# Patient Record
Sex: FEMALE | Race: WHITE | NOT HISPANIC OR LATINO | URBAN - METROPOLITAN AREA
[De-identification: names, ages, dates, MRNs, and addresses within clinical notes are randomized per-mention and may not be internally consistent; named-entity substitution may affect disease eponyms.]

---

## 2024-01-12 ENCOUNTER — INITIAL PRENATAL (OUTPATIENT)
Dept: OBSTETRICS AND GYNECOLOGY | Facility: CLINIC | Age: 29
End: 2024-01-12
Payer: COMMERCIAL

## 2024-01-12 ENCOUNTER — APPOINTMENT (OUTPATIENT)
Dept: OBSTETRICS AND GYNECOLOGY | Facility: CLINIC | Age: 29
End: 2024-01-12
Payer: COMMERCIAL

## 2024-01-12 VITALS
SYSTOLIC BLOOD PRESSURE: 102 MMHG | HEIGHT: 61 IN | DIASTOLIC BLOOD PRESSURE: 62 MMHG | WEIGHT: 129 LBS | BODY MASS INDEX: 24.35 KG/M2

## 2024-01-12 DIAGNOSIS — Z3A.10 10 WEEKS GESTATION OF PREGNANCY (HHS-HCC): ICD-10-CM

## 2024-01-12 DIAGNOSIS — Z87.898 HISTORY OF SUBSTANCE USE: ICD-10-CM

## 2024-01-12 DIAGNOSIS — B00.9 HSV (HERPES SIMPLEX VIRUS) INFECTION: ICD-10-CM

## 2024-01-12 DIAGNOSIS — Z34.81 ENCOUNTER FOR SUPERVISION OF OTHER NORMAL PREGNANCY, FIRST TRIMESTER (HHS-HCC): Primary | ICD-10-CM

## 2024-01-12 LAB
AMPHETAMINES UR QL SCN: NORMAL
BARBITURATES UR QL SCN: NORMAL
BENZODIAZ UR QL SCN: NORMAL
BZE UR QL SCN: NORMAL
CANNABINOIDS UR QL SCN: NORMAL
ERYTHROCYTE [DISTWIDTH] IN BLOOD BY AUTOMATED COUNT: 13.1 % (ref 11.5–14.5)
FENTANYL+NORFENTANYL UR QL SCN: NORMAL
HCT VFR BLD AUTO: 35 % (ref 36–46)
HGB BLD-MCNC: 11.3 G/DL (ref 12–16)
MCH RBC QN AUTO: 29.8 PG (ref 26–34)
MCHC RBC AUTO-ENTMCNC: 32.3 G/DL (ref 32–36)
MCV RBC AUTO: 92 FL (ref 80–100)
NRBC BLD-RTO: 0 /100 WBCS (ref 0–0)
OPIATES UR QL SCN: NORMAL
OXYCODONE+OXYMORPHONE UR QL SCN: NORMAL
PCP UR QL SCN: NORMAL
PLATELET # BLD AUTO: 321 X10*3/UL (ref 150–450)
RBC # BLD AUTO: 3.79 X10*6/UL (ref 4–5.2)
WBC # BLD AUTO: 9.3 X10*3/UL (ref 4.4–11.3)

## 2024-01-12 PROCEDURE — 90471 IMMUNIZATION ADMIN: CPT | Performed by: OBSTETRICS & GYNECOLOGY

## 2024-01-12 PROCEDURE — 86317 IMMUNOASSAY INFECTIOUS AGENT: CPT

## 2024-01-12 PROCEDURE — 86900 BLOOD TYPING SEROLOGIC ABO: CPT

## 2024-01-12 PROCEDURE — 90686 IIV4 VACC NO PRSV 0.5 ML IM: CPT | Performed by: OBSTETRICS & GYNECOLOGY

## 2024-01-12 PROCEDURE — 86850 RBC ANTIBODY SCREEN: CPT

## 2024-01-12 PROCEDURE — 86780 TREPONEMA PALLIDUM: CPT

## 2024-01-12 PROCEDURE — 86901 BLOOD TYPING SEROLOGIC RH(D): CPT

## 2024-01-12 PROCEDURE — 87389 HIV-1 AG W/HIV-1&-2 AB AG IA: CPT

## 2024-01-12 PROCEDURE — 87522 HEPATITIS C REVRS TRNSCRPJ: CPT

## 2024-01-12 PROCEDURE — H1000 PRENATAL CARE ATRISK ASSESSM: HCPCS | Performed by: OBSTETRICS & GYNECOLOGY

## 2024-01-12 PROCEDURE — 88175 CYTOPATH C/V AUTO FLUID REDO: CPT

## 2024-01-12 PROCEDURE — 87800 DETECT AGNT MULT DNA DIREC: CPT

## 2024-01-12 PROCEDURE — 87086 URINE CULTURE/COLONY COUNT: CPT

## 2024-01-12 PROCEDURE — 99204 OFFICE O/P NEW MOD 45 MIN: CPT | Performed by: OBSTETRICS & GYNECOLOGY

## 2024-01-12 PROCEDURE — 80307 DRUG TEST PRSMV CHEM ANLYZR: CPT

## 2024-01-12 PROCEDURE — 36415 COLL VENOUS BLD VENIPUNCTURE: CPT

## 2024-01-12 PROCEDURE — 85027 COMPLETE CBC AUTOMATED: CPT

## 2024-01-12 PROCEDURE — 87340 HEPATITIS B SURFACE AG IA: CPT

## 2024-01-12 RX ORDER — HYDROXYZINE PAMOATE 50 MG/1
50 CAPSULE ORAL DAILY PRN
COMMUNITY
End: 2024-02-07 | Stop reason: WASHOUT

## 2024-01-12 RX ORDER — BUPRENORPHINE AND NALOXONE 4; 1 MG/1; MG/1
1 FILM, SOLUBLE BUCCAL; SUBLINGUAL DAILY
COMMUNITY
Start: 2023-11-18 | End: 2024-02-06 | Stop reason: ALTCHOICE

## 2024-01-12 RX ORDER — PNV,CALCIUM 72/IRON/FOLIC ACID 27 MG-1 MG
1 TABLET ORAL DAILY
COMMUNITY
Start: 2023-12-22

## 2024-01-12 RX ORDER — BUPRENORPHINE HYDROCHLORIDE AND NALOXONE HYDROCHLORIDE DIHYDRATE 8; 2 MG/1; MG/1
1.5 TABLET SUBLINGUAL DAILY
COMMUNITY
Start: 2023-12-29

## 2024-01-12 RX ORDER — VALACYCLOVIR HYDROCHLORIDE 1 G/1
TABLET, FILM COATED ORAL
Qty: 30 TABLET | Refills: 11 | Status: SHIPPED | OUTPATIENT
Start: 2024-01-12 | End: 2024-02-07 | Stop reason: WASHOUT

## 2024-01-12 SDOH — ECONOMIC STABILITY: FOOD INSECURITY: WITHIN THE PAST 12 MONTHS, THE FOOD YOU BOUGHT JUST DIDN'T LAST AND YOU DIDN'T HAVE MONEY TO GET MORE.: NEVER TRUE

## 2024-01-12 SDOH — ECONOMIC STABILITY: FOOD INSECURITY: WITHIN THE PAST 12 MONTHS, YOU WORRIED THAT YOUR FOOD WOULD RUN OUT BEFORE YOU GOT MONEY TO BUY MORE.: NEVER TRUE

## 2024-01-12 ASSESSMENT — PATIENT HEALTH QUESTIONNAIRE - PHQ9
SUM OF ALL RESPONSES TO PHQ9 QUESTIONS 1 & 2: 0
1. LITTLE INTEREST OR PLEASURE IN DOING THINGS: NOT AT ALL
2. FEELING DOWN, DEPRESSED OR HOPELESS: NOT AT ALL

## 2024-01-12 NOTE — ASSESSMENT & PLAN NOTE
IUP at 10.2 weeks by previous ultrasound  Patient with mild nausea, symptoms are improving  Patient on Suboxone 4 mg daily, would consider Subutex, will send for referral to Dr Hoskins  Has been off heroin and methamphetamines since November 2023, will perform drug screen today  Referral to smoking cessation program  Patient desires testing for cystic fibrosis, SMA, and cfDNA  Will obtain ultrasound to confirm dating  Follow-up in 4 weeks

## 2024-01-12 NOTE — LETTER
1/12/2024    To Whom It May Concern at [facility]:    Matthias Tafoya is being followed for her pregnancy at [facility].  Estimated Date of Delivery: 8/7/24. It is considered safest for pregnant patients to be on subutex instead of suboxone during pregnancy, so based on this, I would advise for her therapy to be changed to subutex for the remainder of her pregnancy. Please reach out if you have any questions or concerns, our office number is [phone].    Sincerely,            Kendrick Cano MD  [facility]

## 2024-01-12 NOTE — PROGRESS NOTES
"Subjective     Matthias Tafoya is a 28 y.o.  at Unknown with a working estimated date of delivery of Not found. who presents for a routine prenatal visit.     Patient here for her new OB.      Objective   Physical Exam  Weight: 58.5 kg (129 lb)  Expected Total Weight Gain: 7 kg (15 lb)-11.5 kg (25 lb)   Pregravid BMI: 25.52  BP: 102/62    Fundal Height (cm): 10 cm    PHYSICAL EXAM  GENERAL:  Well developed, well nourished woman in no apparent distress  NECK: Supple trachea midline no masses  THYROID:  Normal size with no masses  RESPIRATORY: Normal respiratory effort, clear to auscultation  CARDIAC:  Regular rate and rhythm no murmurs  BREASTS: Symmetric with no masses and no tenderness  ABDOMEN:  Soft, normal contour, no masses and nontender,   GENITOURINARY:       EGBUS:  no lesions     VAGINA:  normal mucosa and no lesions     CERVIX:  no lesions and nontender     UTERUS: 10 weeks size and nontender     ADNEXA:  no masses and nontender  PSYCHIATRIC:  Patient normal affect, oriented and normal judgment      Prenatal Labs  Urine dip:  No results found for: \"KETONESU\", \"GLUCOSEUR\", \"LEUKOCYTESUR\"    No results found for: \"HGB\", \"HCT\", \"LABPLAT\", \"ABO\", \"LABRHF\", \"LABANTI\", \"LABRPR\", \"HEPBSAG\", \"HIVAB\"          Problem List Items Addressed This Visit       Encounter for supervision of other normal pregnancy, first trimester - Primary    Relevant Orders    CBC Anemia Panel With Reflex,Pregnancy    Hepatitis B Surface Antigen    HIV 1/2 Antigen/Antibody Screen with Reflex to Confirmation    Rubella Antibody, IgG    Syphilis Screen with Reflex    Type And Screen    Urine Culture    THINPREP PAP    Hepatitis C RNA, Quantitative, PCR    US MAC OB imaging order    Referral to Tobacco Cessation Counseling    Flu vaccine (IIV4) age 6 months and greater, preservative free    Referral to Maternal Fetal Medicine    Cystic Fibrosis Carrier Screening    SMA Carrier Screening    [facility] Prenatal Screen    10 weeks " gestation of pregnancy    Overview     Dating: Unsure LMP, US 2023 given SAIGE 2024  History of  x 3, had cholestasis of pregnancy with her last infant delivered at 35 weeks  History of hepatitis C, s/p treated, will check viral load  History of HSV, on suppressive therapy Valtrex 1 g daily  History of substance abuse: Currently on Suboxone 4 mg daily, off heroin and meth since 2023, drug screen today  Social: Patient's mother has custody of 2 children, 1 child was adopted out  Vaping: Will send for smoking cessation program         Current Assessment & Plan     IUP at 10.2 weeks by previous ultrasound  Patient with mild nausea, symptoms are improving  Patient on Suboxone 4 mg daily, would consider Subutex, will send for referral to Dr Hoskins  Has been off heroin and methamphetamines since 2023, will perform drug screen today  Referral to smoking cessation program  Patient desires testing for cystic fibrosis, SMA, and cfDNA  Will obtain ultrasound to confirm dating  Follow-up in 4 weeks          Other Visit Diagnoses       HSV (herpes simplex virus) infection        History of substance use        Relevant Orders    Drug Screen, Urine With Reflex to Confirmation

## 2024-01-13 ENCOUNTER — LAB REQUISITION (OUTPATIENT)
Dept: LAB | Facility: HOSPITAL | Age: 29
End: 2024-01-13
Payer: COMMERCIAL

## 2024-01-13 DIAGNOSIS — Z34.81 ENCOUNTER FOR SUPERVISION OF OTHER NORMAL PREGNANCY, FIRST TRIMESTER (HHS-HCC): ICD-10-CM

## 2024-01-13 LAB
ABO GROUP (TYPE) IN BLOOD: NORMAL
ANTIBODY SCREEN: NORMAL
HBV SURFACE AG SERPL QL IA: NONREACTIVE
HIV 1+2 AB+HIV1 P24 AG SERPL QL IA: NONREACTIVE
REFLEX ADDED, ANEMIA PANEL: NORMAL
RH FACTOR (ANTIGEN D): NORMAL
RUBV IGG SERPL IA-ACNC: 1.3 IA
RUBV IGG SERPL QL IA: POSITIVE
T PALLIDUM AB SER QL: NONREACTIVE

## 2024-01-14 LAB
BACTERIA UR CULT: NO GROWTH
HCV RNA SERPL NAA+PROBE-ACNC: NOT DETECTED K[IU]/ML
HCV RNA SERPL NAA+PROBE-LOG IU: NORMAL {LOG_IU}/ML

## 2024-01-15 ENCOUNTER — TELEPHONE (OUTPATIENT)
Dept: OBSTETRICS AND GYNECOLOGY | Facility: CLINIC | Age: 29
End: 2024-01-15
Payer: COMMERCIAL

## 2024-01-15 ENCOUNTER — SOCIAL WORK (OUTPATIENT)
Dept: OBSTETRICS AND GYNECOLOGY | Facility: CLINIC | Age: 29
End: 2024-01-15
Payer: COMMERCIAL

## 2024-01-15 NOTE — TELEPHONE ENCOUNTER
----- Message from Dominique Gallego RN sent at 1/12/2024  4:58 PM EST -----  Regarding: FW: Referral for Suboxone  Left message informing patient and sent email to [email or web address] requesting scheduling  ----- Message -----  From: Kendrick Cano MD  Sent: 1/12/2024  11:32 AM EST  To: Dominique Gallego RN  Subject: Referral for Suboxone                            Hello. I wanted to send you a message about the [facility] referral order for this patient. Given the indication is for suboxone use the patient instead needs to be seen & referred to the [facility] at . Dr. Hoskins leads this program for suboxone use in pregnancy. You can have your staff call or email them to get her scheduled. Phone: [phone] Email: [email or web address]

## 2024-01-15 NOTE — PROGRESS NOTES
VIRGILIO/EDILIA called patient after receiving a referral from Dr. Kendrick Cano's office. VIRGILIO left a message and requested a call back.

## 2024-01-16 LAB
C TRACH RRNA SPEC QL NAA+PROBE: NEGATIVE
N GONORRHOEA DNA SPEC QL PROBE+SIG AMP: NEGATIVE

## 2024-01-18 ENCOUNTER — SOCIAL WORK (OUTPATIENT)
Dept: PEDIATRICS | Facility: CLINIC | Age: 29
End: 2024-01-18
Payer: COMMERCIAL

## 2024-01-18 NOTE — PROGRESS NOTES
VIRGILIO/EDILIA called patient again attempting to introduce self, explain role and gather additional information. VIRGILIO left a message and requested a call back.

## 2024-01-23 ENCOUNTER — HOSPITAL ENCOUNTER (OUTPATIENT)
Dept: RADIOLOGY | Facility: CLINIC | Age: 29
Discharge: HOME | End: 2024-01-23
Payer: COMMERCIAL

## 2024-01-23 DIAGNOSIS — Z36.82 ENCOUNTER FOR ANTENATAL SCREENING FOR NUCHAL TRANSLUCENCY (HHS-HCC): ICD-10-CM

## 2024-01-23 DIAGNOSIS — Z34.90: ICD-10-CM

## 2024-01-23 DIAGNOSIS — Z34.81 ENCOUNTER FOR SUPERVISION OF OTHER NORMAL PREGNANCY, FIRST TRIMESTER (HHS-HCC): ICD-10-CM

## 2024-01-23 PROCEDURE — 76813 OB US NUCHAL MEAS 1 GEST: CPT

## 2024-01-23 PROCEDURE — 76813 OB US NUCHAL MEAS 1 GEST: CPT | Performed by: OBSTETRICS & GYNECOLOGY

## 2024-01-25 LAB
CYTOLOGY CMNT CVX/VAG CYTO-IMP: NORMAL
LAB AP HPV GENOTYPE QUESTION: NO
LAB AP HPV HR: NORMAL
LAB AP PAP ADDITIONAL TESTS: NORMAL
LAB AP PREVIOUS ABNORMAL HISTORY: NORMAL
LABORATORY COMMENT REPORT: NORMAL
MENSTRUAL HX REPORTED: NORMAL
PATH REPORT.TOTAL CANCER: NORMAL

## 2024-02-05 ENCOUNTER — TELEPHONE (OUTPATIENT)
Dept: PEDIATRICS | Facility: CLINIC | Age: 29
End: 2024-02-05
Payer: COMMERCIAL

## 2024-02-05 PROBLEM — Z3A.14 14 WEEKS GESTATION OF PREGNANCY (HHS-HCC): Status: ACTIVE | Noted: 2024-01-12

## 2024-02-05 NOTE — TELEPHONE ENCOUNTER
EDILIA POOLE attempted to lvm for pt but wasn't able to due to no voicemail box was available.  VIRGILIO sent pt a message VIA [employer].

## 2024-02-06 ENCOUNTER — TELEMEDICINE (OUTPATIENT)
Dept: OBSTETRICS AND GYNECOLOGY | Facility: CLINIC | Age: 29
End: 2024-02-06
Payer: COMMERCIAL

## 2024-02-06 DIAGNOSIS — O99.012 ANEMIA AFFECTING PREGNANCY IN SECOND TRIMESTER (HHS-HCC): ICD-10-CM

## 2024-02-06 DIAGNOSIS — Z86.19 HISTORY OF HEPATITIS C: ICD-10-CM

## 2024-02-06 DIAGNOSIS — F15.21: ICD-10-CM

## 2024-02-06 DIAGNOSIS — Z87.59 HISTORY OF CHOLESTASIS DURING PREGNANCY: ICD-10-CM

## 2024-02-06 DIAGNOSIS — F11.20 OPIOID USE DISORDER, SEVERE, ON MAINTENANCE THERAPY (MULTI): ICD-10-CM

## 2024-02-06 DIAGNOSIS — O99.340 ANXIETY DURING PREGNANCY (HHS-HCC): ICD-10-CM

## 2024-02-06 DIAGNOSIS — Z72.0 NICOTINE USE: ICD-10-CM

## 2024-02-06 DIAGNOSIS — O09.92 HIGH-RISK PREGNANCY, SECOND TRIMESTER (HHS-HCC): Primary | ICD-10-CM

## 2024-02-06 DIAGNOSIS — F41.9 ANXIETY DURING PREGNANCY (HHS-HCC): ICD-10-CM

## 2024-02-06 DIAGNOSIS — Z87.19 HISTORY OF CHOLESTASIS DURING PREGNANCY: ICD-10-CM

## 2024-02-06 PROCEDURE — 99215 OFFICE O/P EST HI 40 MIN: CPT | Mod: TH,GT | Performed by: OBSTETRICS & GYNECOLOGY

## 2024-02-06 PROCEDURE — 1036F TOBACCO NON-USER: CPT | Performed by: OBSTETRICS & GYNECOLOGY

## 2024-02-06 PROCEDURE — 99205 OFFICE O/P NEW HI 60 MIN: CPT | Performed by: OBSTETRICS & GYNECOLOGY
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
## 2024-02-07 ENCOUNTER — ROUTINE PRENATAL (OUTPATIENT)
Dept: OBSTETRICS AND GYNECOLOGY | Facility: CLINIC | Age: 29
End: 2024-02-07
Payer: COMMERCIAL

## 2024-02-07 VITALS — WEIGHT: 135 LBS | BODY MASS INDEX: 25.51 KG/M2 | SYSTOLIC BLOOD PRESSURE: 100 MMHG | DIASTOLIC BLOOD PRESSURE: 60 MMHG

## 2024-02-07 DIAGNOSIS — Z3A.14 14 WEEKS GESTATION OF PREGNANCY (HHS-HCC): ICD-10-CM

## 2024-02-07 DIAGNOSIS — O09.92 HIGH-RISK PREGNANCY, SECOND TRIMESTER (HHS-HCC): Primary | ICD-10-CM

## 2024-02-07 DIAGNOSIS — F11.20 OPIOID USE DISORDER, SEVERE, ON MAINTENANCE THERAPY (MULTI): ICD-10-CM

## 2024-02-07 PROCEDURE — 99213 OFFICE O/P EST LOW 20 MIN: CPT | Performed by: OBSTETRICS & GYNECOLOGY

## 2024-02-07 NOTE — PROGRESS NOTES
"Subjective     Matthias Tafoya is a 28 y.o.  at 14w6d with a working estimated date of delivery of 2024, by Ultrasound who presents for a routine prenatal visit.     Patient doing well with no concerns, on Suboxone 8 mg daily.  Had appointment with Dr Hoskins, continues to be followed at [facility]    Objective   Physical Exam  Weight: 61.2 kg (135 lb)  Expected Total Weight Gain: 7 kg (15 lb)-11.5 kg (25 lb)   Pregravid BMI: 25.52  BP: 100/60  Fetal Heart Rate: 150 Fundal Height (cm): 14 cm    Prenatal Labs  Urine dip:  No results found for: \"KETONESU\", \"GLUCOSEUR\", \"LEUKOCYTESUR\"    Lab Results   Component Value Date    HGB 11.3 (L) 2024    HCT 35.0 (L) 2024    ABO O 2024    HEPBSAG Nonreactive 2024             Problem List Items Addressed This Visit       14 weeks gestation of pregnancy    Overview     Dating: Unsure LMP, US 2023 given SAIGE 2024; MFM US  at 12.5 weeks will use SAIGE 2024  History of  x 3, had cholestasis of pregnancy with her last infant delivered at 35 weeks  History of hepatitis C, s/p treated, viral load is - 1/15/2024  History of HSV, on suppressive therapy Valtrex 1 g daily  History of substance abuse: Currently on Suboxone 8 mg daily, off heroin and meth since 2023, followed by [facility] and [facility]: [facility] [place] [facility], ultrasound for anatomy scan, 30 weeks, and 36 weeks.  Weekly NST at 36 weeks.  Next appointment with Dr Hoskins at 24 weeks   social: Patient's mother has custody of 2 children, 1 child was adopted out  Vaping: Will send for smoking cessation program  ULTRASOUND:  at 12.5 weeks, normal NT, SAIGE 2024; anatomy scan scheduled for 3/12/2024  Normal cfDNA         Current Assessment & Plan     IUP at 14.6 weeks, doing well no concerns  On Suboxone 8 mg daily  Had appointment with Dr Hoskins, will be followed by [facility] and [facility]  Plan for probable delivery [facility] [place] [facility]  Next ultrasound scheduled March " 12  Follow-up appoint with Dr Hoskins scheduled at 24 weeks    PLAN:  Follow-up in 4 weeks         Opioid use disorder, severe, on maintenance therapy (CMS/HCC)    High-risk pregnancy, second trimester - Primary

## 2024-02-07 NOTE — ASSESSMENT & PLAN NOTE
IUP at 14.6 weeks, doing well no concerns  On Suboxone 8 mg daily  Had appointment with Dr Hoskins, will be followed by [facility] and [facility]  Plan for probable delivery at [place]  Next ultrasound scheduled March 12  Follow-up appoint with Dr Hoskins scheduled at 24 weeks    PLAN:  Follow-up in 4 weeks

## 2024-02-07 NOTE — LETTER
2/7/2024    To Whom It May Concern:    Matthias Tafoya is being followed for her pregnancy at [facility].  Estimated Date of Delivery: 8/1/24    Sincerely,          Kendrick Cano MD  [facility]

## 2024-03-06 PROBLEM — Z34.82 MULTIGRAVIDA IN SECOND TRIMESTER (HHS-HCC): Status: ACTIVE | Noted: 2024-01-12

## 2024-03-06 PROBLEM — Z3A.19 19 WEEKS GESTATION OF PREGNANCY (HHS-HCC): Status: ACTIVE | Noted: 2024-01-12

## 2024-03-08 ENCOUNTER — APPOINTMENT (OUTPATIENT)
Dept: RADIOLOGY | Facility: CLINIC | Age: 29
End: 2024-03-08
Payer: COMMERCIAL

## 2024-03-11 ENCOUNTER — APPOINTMENT (OUTPATIENT)
Dept: OBSTETRICS AND GYNECOLOGY | Facility: CLINIC | Age: 29
End: 2024-03-11
Payer: COMMERCIAL

## 2024-03-12 ENCOUNTER — ROUTINE PRENATAL (OUTPATIENT)
Dept: OBSTETRICS AND GYNECOLOGY | Facility: CLINIC | Age: 29
End: 2024-03-12
Payer: COMMERCIAL

## 2024-03-12 ENCOUNTER — HOSPITAL ENCOUNTER (OUTPATIENT)
Dept: RADIOLOGY | Facility: CLINIC | Age: 29
Discharge: HOME | End: 2024-03-12
Payer: COMMERCIAL

## 2024-03-12 VITALS — WEIGHT: 145 LBS | BODY MASS INDEX: 27.4 KG/M2 | SYSTOLIC BLOOD PRESSURE: 90 MMHG | DIASTOLIC BLOOD PRESSURE: 54 MMHG

## 2024-03-12 DIAGNOSIS — Z3A.19 19 WEEKS GESTATION OF PREGNANCY (HHS-HCC): ICD-10-CM

## 2024-03-12 DIAGNOSIS — Z34.81 ENCOUNTER FOR SUPERVISION OF OTHER NORMAL PREGNANCY, FIRST TRIMESTER (HHS-HCC): ICD-10-CM

## 2024-03-12 DIAGNOSIS — O35.9XX0 MATERNAL CARE FOR (SUSPECTED) FETAL ABNORMALITY AND DAMAGE, UNSPECIFIED, NOT APPLICABLE OR UNSPECIFIED (HHS-HCC): ICD-10-CM

## 2024-03-12 DIAGNOSIS — O09.92 HIGH-RISK PREGNANCY, SECOND TRIMESTER (HHS-HCC): Primary | ICD-10-CM

## 2024-03-12 DIAGNOSIS — O99.322: ICD-10-CM

## 2024-03-12 DIAGNOSIS — F11.20: ICD-10-CM

## 2024-03-12 PROCEDURE — 76811 OB US DETAILED SNGL FETUS: CPT | Performed by: OBSTETRICS & GYNECOLOGY

## 2024-03-12 PROCEDURE — 99213 OFFICE O/P EST LOW 20 MIN: CPT | Performed by: OBSTETRICS & GYNECOLOGY

## 2024-03-12 PROCEDURE — 76811 OB US DETAILED SNGL FETUS: CPT

## 2024-03-12 RX ORDER — VALACYCLOVIR HYDROCHLORIDE 1 G/1
TABLET, FILM COATED ORAL
COMMUNITY
Start: 2024-01-12 | End: 2024-06-06 | Stop reason: SDUPTHER

## 2024-03-12 NOTE — PROGRESS NOTES
"Subjective     Matthias Tafoya is a 28 y.o.  at 19w5d with a working estimated date of delivery of 2024, by Ultrasound who presents for a routine prenatal visit.     Patient with good fetal movement, had ultrasound performed today.  Patient with no concerns.    Objective   Physical Exam  Weight: 65.8 kg (145 lb)  Expected Total Weight Gain: 7 kg (15 lb)-11.5 kg (25 lb)   Pregravid BMI: 25.52  BP: 90/54  Fetal Heart Rate: 1409 Fundal Height (cm): 20 cm    Prenatal Labs  Urine dip:  No results found for: \"KETONESU\", \"GLUCOSEUR\", \"LEUKOCYTESUR\"    Lab Results   Component Value Date    HGB 11.3 (L) 2024    HCT 35.0 (L) 2024    ABO O 2024    HEPBSAG Nonreactive 2024             Problem List Items Addressed This Visit       19 weeks gestation of pregnancy    Overview     Dating: Unsure LMP, US 2023 given SAIGE 2024; MFM US  at 12.5 weeks will use SAIGE 2024  History of  x 3, had cholestasis of pregnancy with her last infant delivered at 35 weeks  History of hepatitis C, s/p treated, viral load is - 1/15/2024  History of HSV, on suppressive therapy Valtrex 1 g daily  History of substance abuse: Currently on Suboxone 8 mg daily, off heroin and meth since 2023, followed by [facility] and [facility]: Plan for delivery at [facility], ultrasound for anatomy scan, 30 weeks, and 36 weeks.  Weekly NST at 36 weeks.  Next appointment with Dr Hoskins at 24 weeks on   SOCIAL: Patient's mother has custody of 2 children, 1 child was adopted out  Vaping: Will send for smoking cessation program, patient has not scheduled  ULTRASOUND:  at 12.5 weeks, normal NT, SAIGE 2024; US 3/12/2024 normal anatomy, follow-up in 1 to 2 weeks  Normal cfDNA         Current Assessment & Plan     IUP at 19.5 weeks  Good fetal movement  Continues on Suboxone following that both [facility] and [facility]  Appointment with Dr Hoskins scheduled     PLAN:  Repeat ultrasound in 1 to 2 weeks " for anatomy check  Follow-up in 4 weeks           High-risk pregnancy, second trimester - Primary

## 2024-03-12 NOTE — ASSESSMENT & PLAN NOTE
IUP at 19.5 weeks  Good fetal movement  Continues on Suboxone following that both [facility] clinic and [facility]  Appointment with Dr Hoskins scheduled April 16    PLAN:  Repeat ultrasound in 1 to 2 weeks for anatomy check  Follow-up in 4 weeks     Show Topical Anesthesia Variable?: Yes Include Z78.9 (Other Specified Conditions Influencing Health Status) As An Associated Diagnosis?: No Duration Of Freeze Thaw-Cycle (Seconds): 10 Medical Necessity Clause: This procedure was medically necessary because the lesions that were treated were: irritated Medical Necessity Information: It is in your best interest to select a reason for this procedure from the list below. All of these items fulfill various CMS LCD requirements except the new and changing color options. Detail Level: Simple Consent: The patient's consent was obtained including but not limited to risks of crusting, scabbing, blistering, scarring, darker or lighter pigmentary change, recurrence, incomplete removal and infection. Number Of Freeze-Thaw Cycles: 2 freeze-thaw cycles

## 2024-03-22 ENCOUNTER — HOSPITAL ENCOUNTER (OUTPATIENT)
Dept: RADIOLOGY | Facility: CLINIC | Age: 29
Discharge: HOME | End: 2024-03-22
Payer: COMMERCIAL

## 2024-03-22 DIAGNOSIS — Z36.2 ENCOUNTER FOR OTHER ANTENATAL SCREENING FOLLOW-UP (HHS-HCC): ICD-10-CM

## 2024-03-22 PROCEDURE — 76816 OB US FOLLOW-UP PER FETUS: CPT

## 2024-03-22 PROCEDURE — 76816 OB US FOLLOW-UP PER FETUS: CPT | Performed by: OBSTETRICS & GYNECOLOGY

## 2024-04-08 ENCOUNTER — APPOINTMENT (OUTPATIENT)
Dept: OBSTETRICS AND GYNECOLOGY | Facility: CLINIC | Age: 29
End: 2024-04-08
Payer: COMMERCIAL

## 2024-04-16 ENCOUNTER — TELEMEDICINE (OUTPATIENT)
Dept: OBSTETRICS AND GYNECOLOGY | Facility: CLINIC | Age: 29
End: 2024-04-16
Payer: COMMERCIAL

## 2024-04-16 DIAGNOSIS — O99.340 ANXIETY DURING PREGNANCY (HHS-HCC): ICD-10-CM

## 2024-04-16 DIAGNOSIS — F15.21: ICD-10-CM

## 2024-04-16 DIAGNOSIS — O09.92 HIGH-RISK PREGNANCY, SECOND TRIMESTER (HHS-HCC): Primary | ICD-10-CM

## 2024-04-16 DIAGNOSIS — F41.9 ANXIETY DURING PREGNANCY (HHS-HCC): ICD-10-CM

## 2024-04-16 DIAGNOSIS — F11.20 OPIOID USE DISORDER, SEVERE, ON MAINTENANCE THERAPY (MULTI): ICD-10-CM

## 2024-04-16 PROCEDURE — 1036F TOBACCO NON-USER: CPT | Performed by: OBSTETRICS & GYNECOLOGY

## 2024-04-16 PROCEDURE — 99214 OFFICE O/P EST MOD 30 MIN: CPT | Mod: TH,GT,U1,95 | Performed by: OBSTETRICS & GYNECOLOGY

## 2024-04-16 PROCEDURE — 99214 OFFICE O/P EST MOD 30 MIN: CPT | Performed by: OBSTETRICS & GYNECOLOGY

## 2024-04-16 NOTE — PROGRESS NOTES
Assessment   1. ROMELIA (meth, fentanyl)  - MOUD: cont Suboxone through [facility]. Risks/benefit discussed.   - Behavioral Treatment: cont through [facility]  - Narcan access: Yes  - Recommend 3rd trimester NOWS consult with Dr. Beckham. RN tasked to set up with patient consent.  - UDS appropriate 2024. Would recommend repeating q-trimester to ensure no need to escalate treatment as pt newly in sobriety. Alternatively, pt was asked to bring in documentation from [facility] indicating her care adherence with them. Pt educated that our current policy is to draw UDS at time of delivery for patients in treatment.  - Discussed with pt that in [place], prior loss of parental custody after DCFS investigation typically triggers a DCFS referral for each subsequent birth  - Pt should be able to deliver at [facility]. If baby requires NOWS treatment, dyad will need to be transferred to [facility].     2.  DIALLO, MDD, PTSD  - Cont counseling through [facility]  - Ok to take hydroxyzine prn anxiety, insomnia     3.  Surveillance  - Level 2 Anatomy scan completed and normal  - Growth scan 30, 36 weeks  - Weekly NSTs at 36 weeks if active illicit opioid or stimulant use  - No indication to deliver prior to 39 weeks. Pt undecided on IOL.     NEXT VISIT TIME: 36 weeks     Gertrude Hoskins MD    Subjective   Matthias Tafoya is a 28 y.o. female  24w5d here for FU with EDILIA.  OB = [place]  SAIGE = 24    Virtual or Telephone Consent    An interactive audio and video telecommunication system which permits real time communications between the patient (at the originating site) and provider (at the distant site) was utilized to provide this telehealth service.   Verbal consent was requested and obtained from Matthias Tafoya on this date, 24 for a telehealth visit.   Presents today with: self    Current Preg c/b  1. ROMELIA (meth, fentanyl): In treatment at [facility]. Takes Suboxone now 16mg daily. Does individual counseling weekly.  " also goes through [facility]. Sober date 11/27/23.   2. Hx cholestasis: required IOL at 35 weeks, had Hepatitis C at that pregnancy but was subsequently treated in 2020 postpartum. Viral load undetectable Jan 2024.   3. Vapes nicotine, 1 cartridge lasts her 2-3 days. Pt is not actively trying to cut down at this time.   4. DIALLO, MDD, PTSD: Takes hydroxyzine prn. In counseling. No problems with pelvic exams. No problems with male providers.   5. 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  Feeling well in pregnancy.    Things going well at [facility]. Now every two weeks for appointments.  Reports normal UDS there.  Suboxone dose incr from 12 --> 16mg due to incr demands of pregnancy.  Feeling well without sx of withdrawal or cravings.  Open to NOWS consult.    Indifferent on delivery site, [place] or [facility], if she spontaneously labors.  But if IOL --> would prefer [facility] to minimize chance of transfer.  \"They're both far from me.\"    Last UDS: Jan 2024 - appropriate  OARRS Review: [facility] often does not show up on OARRS as they have an in house pharmacy.     ROMELIA TREATMENT:  Behavioral Health: Yes, describe: [facility] with weekly counseling  MAT: Yes, describe: Suboxone 16mg/d  Narcan Access: Yes  Self Help Group: No    DUAL DIAGNOSIS TREATMENT:  Dx: GDD, MDD, PTSD  Psych: [facility]  Therapist: in counseling  Meds: hydroxyzine prn   SI/HI: No    Objective    General:   Alert and oriented, in no acute distress   Psych Normal affect. Normal mood.        "

## 2024-04-24 ENCOUNTER — TELEPHONE (OUTPATIENT)
Dept: OBSTETRICS AND GYNECOLOGY | Facility: CLINIC | Age: 29
End: 2024-04-24
Payer: COMMERCIAL

## 2024-04-24 NOTE — TELEPHONE ENCOUNTER
----- Message from Lorraine Beckham MD sent at 4/16/2024  2:07 PM EDT -----  Regarding: RE: NOWS consult  Hi Mariam,  Right now I have:  5/9 11-1  5/10 9-10:30 or 11-3  5/21 9-12 or after 3:30  5/23 9:30-12 or 3-4  5/24 9-2  5/28 9-12  5/29 anytime.  Let me know what works.  Lorraine  ----- Message -----  From: Mariam Ferrera, RN  Sent: 4/16/2024   2:01 PM EDT  To: Lorraine Beckham MD; Gertrude Hoskins MD  Subject: RE: NOWS consult                                 I think I know how to do it in EPIC>   [place], what's you availability in May?  ----- Message -----  From: Gertrude Hoskins MD  Sent: 4/16/2024  11:58 AM EDT  To: Lorraine Beckham MD; Mariam Ferrera, RN  Subject: NOWS consult                                     Hi all! Matthias is interested in a virtual NOWS consult. She is currently 24 weeks on Suboxone 16mg/d. She gets OB care with Dr. Almazan and may deliver at [facility] (but if decides on IOL, would go to [facility]). Could you help her set up a consult date?     Thank you!,  Gertrude

## 2024-04-24 NOTE — TELEPHONE ENCOUNTER
4/17/24 - Left message to call RN.  4/23/24 - EPIC message sent to patient.  eb  5/21/24 1030 Virtual visit ok per pt.  Message to Dr Beckham for visit types.  [facility] not allowing RN to schedule.    4/24/24 - Able to complete scheduling for NOWs consult

## 2024-04-30 PROBLEM — Z3A.27 27 WEEKS GESTATION OF PREGNANCY (HHS-HCC): Status: ACTIVE | Noted: 2024-01-12

## 2024-05-06 ENCOUNTER — ROUTINE PRENATAL (OUTPATIENT)
Dept: OBSTETRICS AND GYNECOLOGY | Facility: CLINIC | Age: 29
End: 2024-05-06
Payer: COMMERCIAL

## 2024-05-06 VITALS — DIASTOLIC BLOOD PRESSURE: 64 MMHG | WEIGHT: 162 LBS | BODY MASS INDEX: 30.61 KG/M2 | SYSTOLIC BLOOD PRESSURE: 102 MMHG

## 2024-05-06 DIAGNOSIS — O09.92 HIGH-RISK PREGNANCY, SECOND TRIMESTER (HHS-HCC): Primary | ICD-10-CM

## 2024-05-06 DIAGNOSIS — Z3A.27 27 WEEKS GESTATION OF PREGNANCY (HHS-HCC): ICD-10-CM

## 2024-05-06 LAB
ERYTHROCYTE [DISTWIDTH] IN BLOOD BY AUTOMATED COUNT: 14.2 % (ref 11.5–14.5)
GLUCOSE 1H P 50 G GLC PO SERPL-MCNC: 101 MG/DL
HCT VFR BLD AUTO: 36.9 % (ref 36–46)
HGB BLD-MCNC: 12 G/DL (ref 12–16)
MCH RBC QN AUTO: 30.8 PG (ref 26–34)
MCHC RBC AUTO-ENTMCNC: 32.5 G/DL (ref 32–36)
MCV RBC AUTO: 95 FL (ref 80–100)
NRBC BLD-RTO: 0 /100 WBCS (ref 0–0)
PLATELET # BLD AUTO: 217 X10*3/UL (ref 150–450)
RBC # BLD AUTO: 3.9 X10*6/UL (ref 4–5.2)
WBC # BLD AUTO: 9.9 X10*3/UL (ref 4.4–11.3)

## 2024-05-06 PROCEDURE — 82947 ASSAY GLUCOSE BLOOD QUANT: CPT

## 2024-05-06 PROCEDURE — 99213 OFFICE O/P EST LOW 20 MIN: CPT | Performed by: OBSTETRICS & GYNECOLOGY

## 2024-05-06 PROCEDURE — 36415 COLL VENOUS BLD VENIPUNCTURE: CPT

## 2024-05-06 PROCEDURE — 85027 COMPLETE CBC AUTOMATED: CPT

## 2024-05-06 PROCEDURE — 86780 TREPONEMA PALLIDUM: CPT

## 2024-05-06 NOTE — PROGRESS NOTES
"Subjective     Matthias Tafoya is a 28 y.o.  at 27w4d with a working estimated date of delivery of 2024, by Ultrasound who presents for a routine prenatal visit.     Patient doing well with no concerns.  Patient with good fetal movement.    Objective   Physical Exam  Weight: 73.5 kg (162 lb)  Expected Total Weight Gain: 7 kg (15 lb)-11.5 kg (25 lb)   Pregravid BMI: 25.52  BP: 102/64  Fetal Heart Rate: 150 Fundal Height (cm): 30 cm    Prenatal Labs  Urine dip:  No results found for: \"KETONESU\", \"GLUCOSEUR\", \"LEUKOCYTESUR\"    Lab Results   Component Value Date    HGB 11.3 (L) 2024    HCT 35.0 (L) 2024    ABO O 2024    HEPBSAG Nonreactive 2024             Problem List Items Addressed This Visit       27 weeks gestation of pregnancy ([facility])    Overview     Dating: Unsure LMP, US 2023 given SAIGE 2024; MFM US  at 12.5 weeks will use SAIGE 2024  History of  x 3, had cholestasis of pregnancy with her last infant delivered at 35 weeks  History of hepatitis C, s/p treated, viral load is - 1/15/2024  History of HSV, on suppressive therapy Valtrex 1 g daily  History of substance abuse: Currently on Suboxone 8 mg daily, off heroin and meth since 2023, followed by [facility] and [facility]: Plan for delivery at [facility], ultrasound for anatomy scan, 30 weeks, and 36 weeks.  Weekly NST at 36 weeks.  Saw Dr Hoskins at 24 weeks on ; next appointment at 36 weeks  SOCIAL: Patient's mother has custody of 2 children, 1 child was adopted out  Vaping: Will send for smoking cessation program, patient has not scheduled  ULTRASOUND:  at 12.5 weeks, normal NT, SAIGE 2024; US 3/12 and 3/22 with normal anatomy; will schedule repeat at 30 weeks  Normal cfDNA         Current Assessment & Plan     IUP at 27.4 weeks  Patient with good fetal movement, no concerns.  Patient continues on her Suboxone, followed at [facility] and [facility]  50 g and CBC today    PLAN:  Repeat " ultrasound in 2 to 3 weeks  Follow-up in 2 to 4 weeks         Relevant Orders    Syphilis Screen with Reflex    CBC Anemia Panel With Reflex,Pregnancy    Glucose, 1 Hour Screen, Pregnancy    US MAC OB imaging order    High-risk pregnancy, second trimester ([facility]-H[facility]) - Primary

## 2024-05-06 NOTE — ASSESSMENT & PLAN NOTE
IUP at 27.4 weeks  Patient with good fetal movement, no concerns.  Patient continues on her Suboxone, followed at [facility] and [facility]  50 g and CBC today    PLAN:  Repeat ultrasound in 2 to 3 weeks  Follow-up in 2 to 4 weeks

## 2024-05-07 LAB
REFLEX ADDED, ANEMIA PANEL: NORMAL
TREPONEMA PALLIDUM IGG+IGM AB [PRESENCE] IN SERUM OR PLASMA BY IMMUNOASSAY: NONREACTIVE

## 2024-05-15 PROBLEM — Z3A.29 29 WEEKS GESTATION OF PREGNANCY (HHS-HCC): Status: ACTIVE | Noted: 2024-01-12

## 2024-05-20 ENCOUNTER — ROUTINE PRENATAL (OUTPATIENT)
Dept: OBSTETRICS AND GYNECOLOGY | Facility: CLINIC | Age: 29
End: 2024-05-20
Payer: COMMERCIAL

## 2024-05-20 VITALS — WEIGHT: 166 LBS | BODY MASS INDEX: 31.37 KG/M2 | DIASTOLIC BLOOD PRESSURE: 60 MMHG | SYSTOLIC BLOOD PRESSURE: 100 MMHG

## 2024-05-20 DIAGNOSIS — K59.00 CONSTIPATION DURING PREGNANCY IN THIRD TRIMESTER (HHS-HCC): ICD-10-CM

## 2024-05-20 DIAGNOSIS — O99.613 CONSTIPATION DURING PREGNANCY IN THIRD TRIMESTER (HHS-HCC): ICD-10-CM

## 2024-05-20 DIAGNOSIS — Z3A.29 29 WEEKS GESTATION OF PREGNANCY (HHS-HCC): ICD-10-CM

## 2024-05-20 DIAGNOSIS — O09.93 SUPERVISION OF HIGH RISK PREGNANCY IN THIRD TRIMESTER (HHS-HCC): Primary | ICD-10-CM

## 2024-05-20 DIAGNOSIS — F11.20 OPIOID USE DISORDER, SEVERE, ON MAINTENANCE THERAPY (MULTI): ICD-10-CM

## 2024-05-20 PROBLEM — Z34.83 MULTIGRAVIDA IN THIRD TRIMESTER (HHS-HCC): Status: ACTIVE | Noted: 2024-01-12

## 2024-05-20 LAB
POC GLUCOSE, URINE: NEGATIVE MG/DL
POC PROTEIN, URINE: NEGATIVE MG/DL

## 2024-05-20 PROCEDURE — 99213 OFFICE O/P EST LOW 20 MIN: CPT | Performed by: OBSTETRICS & GYNECOLOGY

## 2024-05-20 RX ORDER — DOCUSATE SODIUM 100 MG/1
100 CAPSULE, LIQUID FILLED ORAL 2 TIMES DAILY PRN
Qty: 30 CAPSULE | Refills: 5 | Status: SHIPPED | OUTPATIENT
Start: 2024-05-20

## 2024-05-20 NOTE — ASSESSMENT & PLAN NOTE
IUP at 29.4 weeks  Good fetal movements, patient doing well with no concerns.  Her Suboxone dose was increased to 16 mg daily  Has appointment with neonatology, Dr. Hallman on May 21, repeat ultrasound scheduled on May 24  Patient will start her Valtrex for suppression of HSV  Patient continues vaping, declines smoking cessation program  Complaint of constipation, will start Colace    PLAN:  Follow-up in 2 to 3 weeks

## 2024-05-20 NOTE — PROGRESS NOTES
"Subjective     Matthias Tafoya is a 28 y.o.  at 29w4d with a working estimated date of delivery of 2024, by Ultrasound who presents for a routine prenatal visit.     Patient with good fetal movement, continues with her Suboxone otherwise doing well.  No concerns.    Objective   Physical Exam  Weight: 75.3 kg (166 lb)  Expected Total Weight Gain: 7 kg (15 lb)-11.5 kg (25 lb)   Pregravid BMI: 25.52  BP: 100/60  Fetal Heart Rate: 150 Fundal Height (cm): 31 cm    Prenatal Labs  Urine dip:  No results found for: \"KETONESU\", \"GLUCOSEUR\", \"LEUKOCYTESUR\"    Lab Results   Component Value Date    HGB 12.0 2024    HCT 36.9 2024    ABO O 2024    HEPBSAG Nonreactive 2024             Problem List Items Addressed This Visit       Supervision of high risk pregnancy in third trimester ([facility]) - Primary    Opioid use disorder, severe, on maintenance therapy (Multi)    29 weeks gestation of pregnancy ([facility])    Overview     Dating: Unsure LMP, US 2023 given SAIGE 2024; MFM US  at 12.5 weeks will use SAIGE 2024  History of  x 3, had cholestasis of pregnancy with her last infant delivered at 35 weeks  History of hepatitis C, s/p treated, viral load is - 1/15/2024  History of HSV, patient on suppressive therapy, Valtrex 1 g daily  History of substance abuse: Currently on Suboxone 16 mg daily, off heroin and meth since 2023, followed by [facility] and [facility]: Plan for delivery at [facility], ultrasound for anatomy scan, 30 weeks, and 36 weeks.  Weekly NST at 36 weeks.    Saw Dr Hoskins at 24 weeks on ; next appointment at 36 weeks  Appointment with neonatology, Dr. Hallman on May 21  SOCIAL: Patient's mother has custody of 2 children, 1 child was adopted out  Vaping: Declines smoking cessation program  ULTRASOUND:  at 12.5 weeks, normal NT, SAIGE 2024; US 3/12 and 3/22 with normal anatomy; repeat scheduled May 24  Normal cfDNA  Normal 50 g         Current Assessment " & Plan     IUP at 29.4 weeks  Good fetal movements, patient doing well with no concerns.  Her Suboxone dose was increased to 16 mg daily  Has appointment with neonatology, Dr. Hallman on May 21, repeat ultrasound scheduled on May 24  Patient will start her Valtrex for suppression of HSV  Patient continues vaping, declines smoking cessation program  Complaint of constipation, will start Colace    PLAN:  Follow-up in 2 to 3 weeks         Relevant Orders    POCT UA Automated manually resulted     Other Visit Diagnoses       Constipation during pregnancy in third trimester ([facility])        Relevant Medications    docusate sodium (Colace) 100 mg capsule

## 2024-05-21 ENCOUNTER — TELEMEDICINE (OUTPATIENT)
Dept: OTHER | Facility: HOSPITAL | Age: 29
End: 2024-05-21
Payer: COMMERCIAL

## 2024-05-21 DIAGNOSIS — O99.320: Primary | ICD-10-CM

## 2024-05-21 DIAGNOSIS — F11.20: Primary | ICD-10-CM

## 2024-05-21 PROCEDURE — 99203 OFFICE O/P NEW LOW 30 MIN: CPT | Performed by: PEDIATRICS

## 2024-05-24 ENCOUNTER — HOSPITAL ENCOUNTER (OUTPATIENT)
Dept: RADIOLOGY | Facility: CLINIC | Age: 29
Discharge: HOME | End: 2024-05-24
Payer: COMMERCIAL

## 2024-05-24 DIAGNOSIS — Z3A.27 27 WEEKS GESTATION OF PREGNANCY (HHS-HCC): ICD-10-CM

## 2024-05-24 PROCEDURE — 76819 FETAL BIOPHYS PROFIL W/O NST: CPT | Performed by: OBSTETRICS & GYNECOLOGY

## 2024-05-24 PROCEDURE — 76816 OB US FOLLOW-UP PER FETUS: CPT

## 2024-05-24 PROCEDURE — 76816 OB US FOLLOW-UP PER FETUS: CPT | Performed by: OBSTETRICS & GYNECOLOGY

## 2024-05-24 PROCEDURE — 76819 FETAL BIOPHYS PROFIL W/O NST: CPT

## 2024-06-03 ENCOUNTER — ROUTINE PRENATAL (OUTPATIENT)
Dept: OBSTETRICS AND GYNECOLOGY | Facility: CLINIC | Age: 29
End: 2024-06-03
Payer: COMMERCIAL

## 2024-06-03 VITALS — SYSTOLIC BLOOD PRESSURE: 106 MMHG | DIASTOLIC BLOOD PRESSURE: 60 MMHG | BODY MASS INDEX: 32.31 KG/M2 | WEIGHT: 171 LBS

## 2024-06-03 DIAGNOSIS — Z23 ENCOUNTER FOR IMMUNIZATION: ICD-10-CM

## 2024-06-03 DIAGNOSIS — O09.93 SUPERVISION OF HIGH RISK PREGNANCY IN THIRD TRIMESTER (HHS-HCC): Primary | ICD-10-CM

## 2024-06-03 DIAGNOSIS — Z3A.31 31 WEEKS GESTATION OF PREGNANCY (HHS-HCC): ICD-10-CM

## 2024-06-03 DIAGNOSIS — F11.20 OPIOID USE DISORDER, SEVERE, ON MAINTENANCE THERAPY (MULTI): ICD-10-CM

## 2024-06-03 LAB
POC GLUCOSE, URINE: NEGATIVE MG/DL
POC PROTEIN, URINE: NEGATIVE MG/DL

## 2024-06-03 PROCEDURE — 90471 IMMUNIZATION ADMIN: CPT | Performed by: OBSTETRICS & GYNECOLOGY

## 2024-06-03 PROCEDURE — 90715 TDAP VACCINE 7 YRS/> IM: CPT | Performed by: OBSTETRICS & GYNECOLOGY

## 2024-06-03 PROCEDURE — 99213 OFFICE O/P EST LOW 20 MIN: CPT | Performed by: OBSTETRICS & GYNECOLOGY

## 2024-06-03 NOTE — ASSESSMENT & PLAN NOTE
IUP at 31.6 weeks  Patient doing well with no concerns, good fetal movement.  Patient on Valtrex, continues on Suboxone, met with neonatology Dr. Hallman on May 21.  Next appointment with Dr Hoskins on July 2.  Reviewed ultrasound from May 24, next ultrasound scheduled June 28  Received Tdap today.    PLAN:  Follow-up in 2 weeks

## 2024-06-03 NOTE — PROGRESS NOTES
"Subjective     Matthias Tafoya is a 28 y.o.  at 31w4d with a working estimated date of delivery of 2024, by Ultrasound who presents for a routine prenatal visit.     Patient with good fetal movement, no concerns    Objective   Physical Exam  Weight: 77.6 kg (171 lb)  Expected Total Weight Gain: 7 kg (15 lb)-11.5 kg (25 lb)   Pregravid BMI: 25.52  BP: 106/60  Fetal Heart Rate: 150 Fundal Height (cm): 33 cm    Prenatal Labs  Urine dip:  No results found for: \"KETONESU\", \"GLUCOSEUR\", \"LEUKOCYTESUR\"    Lab Results   Component Value Date    HGB 12.0 2024    HCT 36.9 2024    ABO O 2024    HEPBSAG Nonreactive 2024             Problem List Items Addressed This Visit       Supervision of high risk pregnancy in third trimester ([facility]) - Primary    Opioid use disorder, severe, on maintenance therapy (Multi)    31 weeks gestation of pregnancy ([facility])    Overview     Dating: Unsure LMP, US 2023 given SAIGE 2024; MFM US  at 12.5 weeks will use SAIGE 2024  History of  x 3, had cholestasis of pregnancy with her last infant delivered at 35 weeks  History of hepatitis C, s/p treated, viral load is - 1/15/2024  History of HSV, patient on suppressive therapy, Valtrex 1 g daily  History of substance abuse: Currently on Suboxone 16 mg daily, off heroin and meth since 2023, followed by [facility] and [facility]: [place] [facility], ultrasound for anatomy scan, 30 weeks, and 36 weeks.  Weekly NST at 36 weeks.    Saw Dr Hoskins at 24 weeks on ; next appointment at 36 weeks on   Patient saw neonatology, Dr. Hallman on May 21  SOCIAL: Patient's mother has custody of 2 children, 1 child was adopted out  Vaping: Declines smoking cessation program  ULTRASOUND:  at 12.5 weeks, normal NT, SAIGE 2024; US 3/12 and 3/22 with normal anatomy; US  at 30 weeks, 1.568 kg, 47%; repeat at 36 weeks, on   Normal cfDNA  Normal 50 g, Tdap given         Current " Assessment & Plan     IUP at 31.6 weeks  Patient doing well with no concerns, good fetal movement.  Patient on Valtrex, continues on Suboxone, met with neonatology Dr. Hallman on May 21.  Next appointment with Dr Hoskins on July 2.  Reviewed ultrasound from May 24, next ultrasound scheduled June 28  Received Tdap today.    PLAN:  Follow-up in 2 weeks         Relevant Orders    POCT UA Automated manually resulted (Completed)

## 2024-06-06 ENCOUNTER — TELEPHONE (OUTPATIENT)
Dept: OBSTETRICS AND GYNECOLOGY | Facility: CLINIC | Age: 29
End: 2024-06-06
Payer: COMMERCIAL

## 2024-06-06 DIAGNOSIS — B00.9 HSV (HERPES SIMPLEX VIRUS) INFECTION: ICD-10-CM

## 2024-06-06 RX ORDER — VALACYCLOVIR HYDROCHLORIDE 1 G/1
TABLET, FILM COATED ORAL
Qty: 30 TABLET | Refills: 5 | Status: SHIPPED | OUTPATIENT
Start: 2024-06-06

## 2024-06-06 NOTE — TELEPHONE ENCOUNTER
----- Message from Matthias Tafoya sent at 6/6/2024  3:47 PM EDT -----  Regarding: Prescription Refill  Contact: [phone]  Hello, I’m currently with Dr. Almazan but was transferred to you. I ran out of my Valtrex which Norah told me to start taking daily I tried to go refill it and [facility] said it was cancelled by the prescriber so could you please send me a prescription in to [facility] in [place] so I can continue taking them thank you.

## 2024-06-22 PROBLEM — Z3A.34 34 WEEKS GESTATION OF PREGNANCY (HHS-HCC): Status: ACTIVE | Noted: 2024-01-12

## 2024-06-24 ENCOUNTER — APPOINTMENT (OUTPATIENT)
Dept: OBSTETRICS AND GYNECOLOGY | Facility: CLINIC | Age: 29
End: 2024-06-24
Payer: COMMERCIAL

## 2024-06-24 VITALS — SYSTOLIC BLOOD PRESSURE: 110 MMHG | BODY MASS INDEX: 32.88 KG/M2 | WEIGHT: 174 LBS | DIASTOLIC BLOOD PRESSURE: 60 MMHG

## 2024-06-24 DIAGNOSIS — Z3A.34 34 WEEKS GESTATION OF PREGNANCY (HHS-HCC): ICD-10-CM

## 2024-06-24 DIAGNOSIS — O09.93 SUPERVISION OF HIGH RISK PREGNANCY IN THIRD TRIMESTER (HHS-HCC): Primary | ICD-10-CM

## 2024-06-24 PROCEDURE — 99213 OFFICE O/P EST LOW 20 MIN: CPT | Performed by: OBSTETRICS & GYNECOLOGY

## 2024-06-24 NOTE — PROGRESS NOTES
"Subjective     Matthias Tafoya is a 28 y.o.  at 34w4d with a working estimated date of delivery of 2024, by Ultrasound who presents for a routine prenatal visit.     Patient with good fetal movement, does have irregular contractions.    Objective   Physical Exam  Weight: 78.9 kg (174 lb)  Expected Total Weight Gain: 7 kg (15 lb)-11.5 kg (25 lb)   Pregravid BMI: 25.52  BP: 110/60  Fetal Heart Rate: 160 Fundal Height (cm): 35 cm    Prenatal Labs  Urine dip:  No results found for: \"KETONESU\", \"GLUCOSEUR\", \"LEUKOCYTESUR\"    Lab Results   Component Value Date    HGB 12.0 2024    HCT 36.9 2024    ABO O 2024    HEPBSAG Nonreactive 2024             Problem List Items Addressed This Visit       Supervision of high risk pregnancy in third trimester ([facility]) - Primary    34 weeks gestation of pregnancy ([facility])    Overview     Dating: Unsure LMP, US 2023 given SAIGE 2024; MFM US  at 12.5 weeks will use SAIGE 2024  History of  x 3, had cholestasis of pregnancy with her last infant delivered at 35 weeks  History of hepatitis C, s/p treated, viral load is - 1/15/2024  History of HSV, patient on suppressive therapy, Valtrex 1 g daily  History of substance abuse: Currently on Suboxone 16 mg daily, off heroin and meth since 2023, followed by [facility] and [facility]: Plan for delivery at [facility], ultrasound for anatomy scan, 30 weeks, and 36 weeks.  Weekly NST at 36 weeks.    Saw Dr Hoskins at 24 weeks on ; next appointment at 36 weeks on   Patient saw neonatology, Dr. Hallman on May 21  SOCIAL: Patient's mother has custody of 2 children, 1 child was adopted out  Vaping: Declines smoking cessation program  ULTRASOUND:  at 12.5 weeks, normal NT, SAIGE 2024; US 3/12 and 3/22 with normal anatomy; US  at 30 weeks, 1.568 kg, 47%; repeat at 36 weeks, on   Normal cfDNA  Normal 50 g, Tdap given         Current Assessment & Plan     IUP at 34.4 " weeks  Patient continues with good fetal movement  Patient continues on Suboxone 16 mg daily and Valtrex  Patient has appointments for her ultrasound on June 28, Dr Hoskins on July 2, and follow-up appointment here with Dr. Mcconnell and an NST on July 8  Planning for delivery at [facility]

## 2024-06-24 NOTE — ASSESSMENT & PLAN NOTE
IUP at 34.4 weeks  Patient continues with good fetal movement  Patient continues on Suboxone 16 mg daily and Valtrex  Patient has appointments for her ultrasound on June 28, Dr Hoskins on July 2, and follow-up appointment here with Dr. Mcconnell and an NST on July 8  Planning for delivery at [facility]

## 2024-06-28 ENCOUNTER — HOSPITAL ENCOUNTER (OUTPATIENT)
Dept: RADIOLOGY | Facility: CLINIC | Age: 29
Discharge: HOME | End: 2024-06-28
Payer: COMMERCIAL

## 2024-06-28 DIAGNOSIS — Z3A.27 27 WEEKS GESTATION OF PREGNANCY (HHS-HCC): ICD-10-CM

## 2024-06-28 PROCEDURE — 76816 OB US FOLLOW-UP PER FETUS: CPT

## 2024-07-02 ENCOUNTER — TELEMEDICINE (OUTPATIENT)
Dept: OBSTETRICS AND GYNECOLOGY | Facility: CLINIC | Age: 29
End: 2024-07-02
Payer: COMMERCIAL

## 2024-07-02 ENCOUNTER — TELEPHONE (OUTPATIENT)
Dept: OBSTETRICS AND GYNECOLOGY | Facility: CLINIC | Age: 29
End: 2024-07-02

## 2024-07-02 DIAGNOSIS — F15.21: ICD-10-CM

## 2024-07-02 DIAGNOSIS — Z86.19 HISTORY OF HEPATITIS C: ICD-10-CM

## 2024-07-02 DIAGNOSIS — Z87.19 HISTORY OF CHOLESTASIS DURING PREGNANCY: Primary | ICD-10-CM

## 2024-07-02 DIAGNOSIS — Z34.83 MULTIGRAVIDA IN THIRD TRIMESTER (HHS-HCC): Primary | ICD-10-CM

## 2024-07-02 DIAGNOSIS — F11.20 OPIOID USE DISORDER, SEVERE, ON MAINTENANCE THERAPY (MULTI): ICD-10-CM

## 2024-07-02 DIAGNOSIS — Z87.59 HISTORY OF CHOLESTASIS DURING PREGNANCY: Primary | ICD-10-CM

## 2024-07-02 PROCEDURE — 1036F TOBACCO NON-USER: CPT | Performed by: OBSTETRICS & GYNECOLOGY

## 2024-07-02 PROCEDURE — 99214 OFFICE O/P EST MOD 30 MIN: CPT | Mod: TH,GT,U1 | Performed by: OBSTETRICS & GYNECOLOGY

## 2024-07-02 PROCEDURE — 99214 OFFICE O/P EST MOD 30 MIN: CPT | Performed by: OBSTETRICS & GYNECOLOGY

## 2024-07-02 NOTE — TELEPHONE ENCOUNTER
Induction of labor per Dr Hoskins at 39 weeks GA for Opioid use disorder in remission  Scheduled for 7/25/2024  Arrive at 1100  Patient notified  Visitor Policy reviewed.   Patient coming from [place], will send address and directions

## 2024-07-02 NOTE — TELEPHONE ENCOUNTER
----- Message from Mariam Ferrera RN sent at 7/2/2024  2:16 PM EDT -----  Regarding: IOL  Would like IOL Thurs July 25th, AM, at 39 weeks for hx ROMELIA  she is coming from farther away - [facility]  My next fu with her 2 weeks postpartum

## 2024-07-02 NOTE — PROGRESS NOTES
Assessment   1. ROMELIA (meth, fentanyl)  - MOUD: cont Suboxone 16mg/d through [facility]. Risks/benefit discussed.   - Behavioral Treatment: cont through [facility]  - Narcan access: Yes  - sp NOWS consult with Dr. Beckham on 24  - UDS appropriate 2024. Pt has been informed that UDS would be repeated at time of admission for delivery.  - Recommend pt bring in documentation from [facility] indicating her care adherence with them, as that would be helpful for the likely DCFS referral that would be triggered for her based on her past loss of custody  - Pt will [place] [facility] [place] avoid risk of transfer     2.  DIALLO, MDD, PTSD  - Cont counseling through [facility]  - Ok to take hydroxyzine prn anxiety, insomnia     3.  Surveillance  - Level 2 Anatomy scan completed and normal  - EFS 45% at 35 weeks  - Pt interested in 39 week IOL ( is her mother's birthday) at [facility]. RN tasked to set up.     NEXT VISIT TIME: 2-3 weeks postpartum     Gertrude Hoskins MD    Jordi Tafoya is a 28 y.o. female  35w5d here for FU with EDILIA.  OB = [place]  SAIGE = 24     Current Preg c/b  1. ROMELIA (meth, fentanyl): In treatment at [facility]. Takes Suboxone 16mg daily. Does individual counseling weekly.  also goes through [facility]. Sober date 23.   2. Hx cholestasis: required IOL at 35 weeks, had Hepatitis C at that pregnancy but was subsequently treated in  postpartum. Viral load undetectable 2024. No sx of cholestasis today.  3. Vapes nicotine, 1 cartridge lasts her 2-3 days. Pt is not actively trying to cut down at this time.   4. DIALLO, MDD, PTSD: Takes hydroxyzine prn. In counseling. No problems with pelvic exams. No problems with male providers.      Interval HX:  Feeling well.   Things going well at [facility].  Reports normal UDS there.  Feeling well without sx of withdrawal or cravings.  She states they will be giving her some paperwork to bring to the hospital.      Last UDS: Jan 2024 - appropriate  OARRS Review: [facility] often does not show up on OARRS as they have an in house pharmacy.      ROMELIA TREATMENT:  Behavioral Health: Yes, describe: [facility] with weekly counseling  MAT: Yes, describe: Suboxone 16mg/d  Narcan Access: Yes  Self Help Group: No     DUAL DIAGNOSIS TREATMENT:  Dx: GDD, MDD, PTSD  Psych: [facility]  Therapist: in counseling  Meds: hydroxyzine prn   SI/HI: No    Objective   Virtual or Telephone Consent    An interactive audio and video telecommunication system which permits real time communications between the patient (at the originating site) and provider (at the distant site) was utilized to provide this telehealth service.   Verbal consent was requested and obtained from Matthias Tafoya on this date, 07/02/24 for a telehealth visit.

## 2024-07-08 ENCOUNTER — APPOINTMENT (OUTPATIENT)
Dept: OBSTETRICS AND GYNECOLOGY | Facility: CLINIC | Age: 29
End: 2024-07-08
Payer: COMMERCIAL

## 2024-07-08 ENCOUNTER — ROUTINE PRENATAL (OUTPATIENT)
Dept: OBSTETRICS AND GYNECOLOGY | Facility: CLINIC | Age: 29
End: 2024-07-08
Payer: COMMERCIAL

## 2024-07-08 VITALS — DIASTOLIC BLOOD PRESSURE: 58 MMHG | BODY MASS INDEX: 34.2 KG/M2 | WEIGHT: 181 LBS | SYSTOLIC BLOOD PRESSURE: 98 MMHG

## 2024-07-08 DIAGNOSIS — Z72.0 NICOTINE USE: ICD-10-CM

## 2024-07-08 DIAGNOSIS — O99.012 ANEMIA AFFECTING PREGNANCY IN SECOND TRIMESTER (HHS-HCC): ICD-10-CM

## 2024-07-08 DIAGNOSIS — O99.340 ANXIETY DURING PREGNANCY (HHS-HCC): ICD-10-CM

## 2024-07-08 DIAGNOSIS — Z34.83 MULTIGRAVIDA IN THIRD TRIMESTER (HHS-HCC): ICD-10-CM

## 2024-07-08 DIAGNOSIS — Z87.59 HISTORY OF CHOLESTASIS DURING PREGNANCY: ICD-10-CM

## 2024-07-08 DIAGNOSIS — O09.93 SUPERVISION OF HIGH RISK PREGNANCY IN THIRD TRIMESTER (HHS-HCC): ICD-10-CM

## 2024-07-08 DIAGNOSIS — F41.9 ANXIETY DURING PREGNANCY (HHS-HCC): ICD-10-CM

## 2024-07-08 DIAGNOSIS — Z86.19 HISTORY OF HEPATITIS C: Primary | ICD-10-CM

## 2024-07-08 DIAGNOSIS — F11.20 OPIOID USE DISORDER, SEVERE, ON MAINTENANCE THERAPY (MULTI): ICD-10-CM

## 2024-07-08 DIAGNOSIS — Z87.19 HISTORY OF CHOLESTASIS DURING PREGNANCY: ICD-10-CM

## 2024-07-08 DIAGNOSIS — Z3A.36 36 WEEKS GESTATION OF PREGNANCY (HHS-HCC): ICD-10-CM

## 2024-07-08 DIAGNOSIS — F15.21: ICD-10-CM

## 2024-07-08 PROCEDURE — 87081 CULTURE SCREEN ONLY: CPT

## 2024-07-08 PROCEDURE — 99213 OFFICE O/P EST LOW 20 MIN: CPT | Performed by: STUDENT IN AN ORGANIZED HEALTH CARE EDUCATION/TRAINING PROGRAM

## 2024-07-08 PROCEDURE — 59025 FETAL NON-STRESS TEST: CPT | Performed by: STUDENT IN AN ORGANIZED HEALTH CARE EDUCATION/TRAINING PROGRAM

## 2024-07-08 NOTE — PROCEDURES
Matthias Tafoya, a  at 36w4d with an SAIGE of 2024, by Ultrasound, was seen at [facility] for a nonstress test.    Non-Stress Test   Baseline Fetal Heart Rate for Non-Stress Test: 135 BPM  Variability in Waveform for Non-Stress Test: Moderate  Accelerations in Non-Stress Test: Yes  Decelerations in Non-Stress Test: None  Contractions in Non-Stress Test: Not present  Acoustic Stimulator for Non-Stress Test: No  Interpretation of Non-Stress Test   Interpretation of Non-Stress Test: Reactive

## 2024-07-08 NOTE — PROGRESS NOTES
"Subjective   Patient ID [id number]   Matthias Tafoya is a 28 y.o.  at 36w4d with a working estimated date of delivery of 2024, by Ultrasound who presents for a routine prenatal visit. She denies vaginal bleeding, leakage of fluid, decreased fetal movements, or contractions.    Her pregnancy is complicated by:  Hx of substance abuse (stable on suboxone), DIALLO, Hx of tobacco use, Hx of Hep C (neg viral load), Hx of cholestasis in pregnancy.    Objective   Physical Exam:   Weight: 82.1 kg (181 lb)  Expected Total Weight Gain: 7 kg (15 lb)-11.5 kg (25 lb)   Pregravid BMI: 25.52  BP: 98/58  Fetal Heart Rate: 135 Fundal Height (cm): 36 cm Presentation: Vertex           Prenatal Labs  Urine Dip:  No results found for: \"KETONESU\", \"GLUCOSEUR\", \"LEUKOCYTESUR\"  Lab Results   Component Value Date    HGB 12.0 2024    HCT 36.9 2024    ABO O 2024    HEPBSAG Nonreactive 2024         Assessment/Plan   Problem List Items Addressed This Visit       36 weeks gestation of pregnancy ([facility])    Overview     Dating: Unsure LMP, US 2023 given SAIGE 2024; MFM US  at 12.5 weeks will use SAIGE 2024  History of  x 3, had cholestasis of pregnancy with her last infant delivered at 35 weeks    History of hepatitis C, s/p treated, viral load is - 1/15/2024    History of HSV, patient on suppressive therapy, Valtrex 1 g daily    History of substance abuse: Currently on Suboxone 16 mg daily, off heroin and meth since 2023, followed by [facility] and [facility]: Plan for delivery at [facility], ultrasound for anatomy scan, 30 weeks, and 36 weeks.  Weekly NST at 36 weeks.      Saw Dr Hoskins at 24 weeks on ; next appointment at 36 weeks on   Patient saw neonatology, Dr. Hallman on May 21  SOCIAL: Patient's mother has custody of 2 children, 1 child was adopted out  Vaping: Declines smoking cessation program  ULTRASOUND:  at 12.5 weeks, normal NT, SAIGE 2024; US 3/12 and 3/22 with " "normal anatomy; US 5/24 at 30 weeks, 1.568 kg, 47%  35 week EFW 2585g, 45%.   Normal cfDNA  Normal 50 g, Tdap given         Relevant Orders    Group B Streptococcus (GBS) Prenatal Screen, Culture    Anemia affecting pregnancy in second trimester ([facility])    Anxiety during pregnancy ([facility])    History of cholestasis during pregnancy    History of hepatitis C - Primary    Methamphetamine use disorder, moderate, in early remission (Multi)    Multigravida in third trimester ([facility])    Nicotine use    Opioid use disorder, severe, on maintenance therapy (Multi)    Supervision of high risk pregnancy in third trimester ([facility])    Overview     35 week usn findings: \"The fetal gall bladder is hyperechoic but does not show shadowing.  -No malformations were identified on a limited survey  Hyperechoic gall bladder is more common in the late third trimester. It can be associated with diseases manifesting hemolysis such as hereditary spherocytosis and G6PD  + precipitating substance. The vast majority of the time this is an incidental finding with normal outcomes.\"          Follow up in 1 week for a routine prenatal visit.  "

## 2024-07-11 LAB — GP B STREP GENITAL QL CULT: ABNORMAL

## 2024-07-16 ENCOUNTER — APPOINTMENT (OUTPATIENT)
Dept: OBSTETRICS AND GYNECOLOGY | Facility: CLINIC | Age: 29
End: 2024-07-16
Payer: COMMERCIAL

## 2024-07-16 VITALS — DIASTOLIC BLOOD PRESSURE: 63 MMHG | BODY MASS INDEX: 33.82 KG/M2 | SYSTOLIC BLOOD PRESSURE: 119 MMHG | WEIGHT: 179 LBS

## 2024-07-16 DIAGNOSIS — F15.21: ICD-10-CM

## 2024-07-16 DIAGNOSIS — Z86.19 HISTORY OF HEPATITIS C: ICD-10-CM

## 2024-07-16 DIAGNOSIS — Z34.83 MULTIGRAVIDA IN THIRD TRIMESTER (HHS-HCC): ICD-10-CM

## 2024-07-16 DIAGNOSIS — O99.340 ANXIETY DURING PREGNANCY (HHS-HCC): ICD-10-CM

## 2024-07-16 DIAGNOSIS — O99.012 ANEMIA AFFECTING PREGNANCY IN SECOND TRIMESTER (HHS-HCC): ICD-10-CM

## 2024-07-16 DIAGNOSIS — F41.9 ANXIETY DURING PREGNANCY (HHS-HCC): ICD-10-CM

## 2024-07-16 DIAGNOSIS — Z87.59 HISTORY OF CHOLESTASIS DURING PREGNANCY: ICD-10-CM

## 2024-07-16 DIAGNOSIS — Z72.0 NICOTINE USE: ICD-10-CM

## 2024-07-16 DIAGNOSIS — Z3A.36 36 WEEKS GESTATION OF PREGNANCY (HHS-HCC): Primary | ICD-10-CM

## 2024-07-16 DIAGNOSIS — Z3A.37 37 WEEKS GESTATION OF PREGNANCY (HHS-HCC): ICD-10-CM

## 2024-07-16 DIAGNOSIS — F11.20 OPIOID USE DISORDER, SEVERE, ON MAINTENANCE THERAPY (MULTI): ICD-10-CM

## 2024-07-16 DIAGNOSIS — Z87.19 HISTORY OF CHOLESTASIS DURING PREGNANCY: ICD-10-CM

## 2024-07-16 DIAGNOSIS — O09.93 SUPERVISION OF HIGH RISK PREGNANCY IN THIRD TRIMESTER (HHS-HCC): ICD-10-CM

## 2024-07-16 LAB
POC GLUCOSE, URINE: NEGATIVE MG/DL
POC PROTEIN, URINE: NEGATIVE MG/DL

## 2024-07-16 PROCEDURE — 99213 OFFICE O/P EST LOW 20 MIN: CPT | Performed by: STUDENT IN AN ORGANIZED HEALTH CARE EDUCATION/TRAINING PROGRAM

## 2024-07-16 NOTE — PROGRESS NOTES
"Subjective   Patient ID [id number]   Matthias Tafoya is a 28 y.o.  at 37w5d with a working estimated date of delivery of 2024, by Ultrasound who presents for a routine prenatal visit. She denies vaginal bleeding, leakage of fluid, decreased fetal movements, or contractions.    Her pregnancy is complicated by:  Hx of substance abuse (stable on suboxone), DIALLO, Hx of tobacco use, Hx of Hep C (neg viral load), Hx of cholestasis in pregnancy.    Objective   Physical Exam:   Weight: 81.2 kg (179 lb)  Expected Total Weight Gain: 7 kg (15 lb)-11.5 kg (25 lb)   Pregravid BMI: 25.52  BP: 119/63  Fetal Heart Rate: 140 Fundal Height (cm): 37 cm Presentation: Vertex           Prenatal Labs  Urine Dip:  No results found for: \"KETONESU\", \"GLUCOSEUR\", \"LEUKOCYTESUR\"  Lab Results   Component Value Date    HGB 12.0 2024    HCT 36.9 2024    ABO O 2024    HEPBSAG Nonreactive 2024         Assessment/Plan   Problem List Items Addressed This Visit       36 weeks gestation of pregnancy ([facility]) - Primary    Overview     Dating: Unsure LMP, US 2023 given SAIGE 2024; MFM US  at 12.5 weeks will use SAIGE 2024  History of  x 3, had cholestasis of pregnancy with her last infant delivered at 35 weeks    History of hepatitis C, s/p treated, viral load is - 1/15/2024    History of HSV, patient on suppressive therapy, Valtrex 1 g daily    History of substance abuse: Currently on Suboxone 16 mg daily, off heroin and meth since 2023, followed by [facility] and [facility]: Plan for delivery at [facility], ultrasound for anatomy scan, 30 weeks, and 36 weeks.  Weekly NST at 36 weeks.      Saw Dr Hoskins at 24 weeks on ; next appointment at 36 weeks on . See recommendations. Plan for IOL at [facility].    Patient saw neonatology, Dr. Hallman on May 21  SOCIAL: Patient's mother has custody of 2 children, 1 child was adopted out  Vaping: Declines smoking cessation program  ULTRASOUND:  " "at 12.5 weeks, normal NT, SAIGE 8/1/2024; US 3/12 and 3/22 with normal anatomy; US 5/24 at 30 weeks, 1.568 kg, 47%  35 week EFW 2585g, 45%.   Normal cfDNA  Normal 50 g, Tdap given         Anemia affecting pregnancy in second trimester ([facility])    Anxiety during pregnancy ([facility])    History of cholestasis during pregnancy    History of hepatitis C    Methamphetamine use disorder, moderate, in early remission (Multi)    Multigravida in third trimester ([facility])    Nicotine use    Opioid use disorder, severe, on maintenance therapy (Multi)    Supervision of high risk pregnancy in third trimester ([facility])    Overview     35 week usn findings: \"The fetal gall bladder is hyperechoic but does not show shadowing.  -No malformations were identified on a limited survey  Hyperechoic gall bladder is more common in the late third trimester. It can be associated with diseases manifesting hemolysis such as hereditary spherocytosis and G6PD  + precipitating substance. The vast majority of the time this is an incidental finding with normal outcomes.\"          Other Visit Diagnoses       37 weeks gestation of pregnancy ([facility])        Relevant Orders    POCT UA (nonautomated) manually resulted (Completed)            Follow up in 1 week for a routine prenatal visit.  "

## 2024-07-22 ENCOUNTER — APPOINTMENT (OUTPATIENT)
Dept: OBSTETRICS AND GYNECOLOGY | Facility: CLINIC | Age: 29
End: 2024-07-22
Payer: COMMERCIAL

## 2024-07-25 ENCOUNTER — APPOINTMENT (OUTPATIENT)
Dept: OBSTETRICS AND GYNECOLOGY | Facility: HOSPITAL | Age: 29
End: 2024-07-25
Payer: COMMERCIAL

## 2024-07-25 ENCOUNTER — ANESTHESIA EVENT (OUTPATIENT)
Dept: OBSTETRICS AND GYNECOLOGY | Facility: HOSPITAL | Age: 29
End: 2024-07-25
Payer: COMMERCIAL

## 2024-07-25 ENCOUNTER — ANESTHESIA (OUTPATIENT)
Dept: OBSTETRICS AND GYNECOLOGY | Facility: HOSPITAL | Age: 29
End: 2024-07-25
Payer: COMMERCIAL

## 2024-07-25 ENCOUNTER — HOSPITAL ENCOUNTER (INPATIENT)
Facility: HOSPITAL | Age: 29
LOS: 2 days | Discharge: HOME | End: 2024-07-27
Attending: OBSTETRICS & GYNECOLOGY | Admitting: OBSTETRICS & GYNECOLOGY
Payer: COMMERCIAL

## 2024-07-25 DIAGNOSIS — Z30.9 ENCOUNTER FOR CONTRACEPTIVE MANAGEMENT, UNSPECIFIED TYPE: Primary | ICD-10-CM

## 2024-07-25 DIAGNOSIS — F11.20 OPIOID USE DISORDER, SEVERE, ON MAINTENANCE THERAPY (MULTI): ICD-10-CM

## 2024-07-25 DIAGNOSIS — O99.613 CONSTIPATION DURING PREGNANCY IN THIRD TRIMESTER (HHS-HCC): ICD-10-CM

## 2024-07-25 DIAGNOSIS — Z34.83 MULTIGRAVIDA IN THIRD TRIMESTER (HHS-HCC): ICD-10-CM

## 2024-07-25 DIAGNOSIS — K59.00 CONSTIPATION DURING PREGNANCY IN THIRD TRIMESTER (HHS-HCC): ICD-10-CM

## 2024-07-25 PROBLEM — Z34.90 ENCOUNTER FOR INDUCTION OF LABOR (HHS-HCC): Status: ACTIVE | Noted: 2024-07-25

## 2024-07-25 LAB
ABO GROUP (TYPE) IN BLOOD: NORMAL
AMPHETAMINES UR QL SCN: NORMAL
ANTIBODY SCREEN: NORMAL
BARBITURATES UR QL SCN: NORMAL
BENZODIAZ UR QL SCN: NORMAL
BZE UR QL SCN: NORMAL
CANNABINOIDS UR QL SCN: NORMAL
ERYTHROCYTE [DISTWIDTH] IN BLOOD BY AUTOMATED COUNT: 13.6 % (ref 11.5–14.5)
FENTANYL+NORFENTANYL UR QL SCN: NORMAL
HCT VFR BLD AUTO: 32.7 % (ref 36–46)
HGB BLD-MCNC: 11.1 G/DL (ref 12–16)
MCH RBC QN AUTO: 31.4 PG (ref 26–34)
MCHC RBC AUTO-ENTMCNC: 33.9 G/DL (ref 32–36)
MCV RBC AUTO: 93 FL (ref 80–100)
METHADONE UR QL SCN: NORMAL
NRBC BLD-RTO: 0 /100 WBCS (ref 0–0)
OPIATES UR QL SCN: NORMAL
OXYCODONE+OXYMORPHONE UR QL SCN: NORMAL
PCP UR QL SCN: NORMAL
PLATELET # BLD AUTO: 180 X10*3/UL (ref 150–450)
RBC # BLD AUTO: 3.53 X10*6/UL (ref 4–5.2)
RH FACTOR (ANTIGEN D): NORMAL
TREPONEMA PALLIDUM IGG+IGM AB [PRESENCE] IN SERUM OR PLASMA BY IMMUNOASSAY: NONREACTIVE
WBC # BLD AUTO: 13.1 X10*3/UL (ref 4.4–11.3)

## 2024-07-25 PROCEDURE — 10907ZC DRAINAGE OF AMNIOTIC FLUID, THERAPEUTIC FROM PRODUCTS OF CONCEPTION, VIA NATURAL OR ARTIFICIAL OPENING: ICD-10-PCS | Performed by: OBSTETRICS & GYNECOLOGY

## 2024-07-25 PROCEDURE — 59409 OBSTETRICAL CARE: CPT | Performed by: STUDENT IN AN ORGANIZED HEALTH CARE EDUCATION/TRAINING PROGRAM

## 2024-07-25 PROCEDURE — 2500000002 HC RX 250 W HCPCS SELF ADMINISTERED DRUGS (ALT 637 FOR MEDICARE OP, ALT 636 FOR OP/ED): Performed by: STUDENT IN AN ORGANIZED HEALTH CARE EDUCATION/TRAINING PROGRAM

## 2024-07-25 PROCEDURE — 80307 DRUG TEST PRSMV CHEM ANLYZR: CPT | Performed by: STUDENT IN AN ORGANIZED HEALTH CARE EDUCATION/TRAINING PROGRAM

## 2024-07-25 PROCEDURE — 7100000016 HC LABOR RECOVERY PER HOUR

## 2024-07-25 PROCEDURE — 3E033VJ INTRODUCTION OF OTHER HORMONE INTO PERIPHERAL VEIN, PERCUTANEOUS APPROACH: ICD-10-PCS | Performed by: STUDENT IN AN ORGANIZED HEALTH CARE EDUCATION/TRAINING PROGRAM

## 2024-07-25 PROCEDURE — 85027 COMPLETE CBC AUTOMATED: CPT | Performed by: STUDENT IN AN ORGANIZED HEALTH CARE EDUCATION/TRAINING PROGRAM

## 2024-07-25 PROCEDURE — 59050 FETAL MONITOR W/REPORT: CPT

## 2024-07-25 PROCEDURE — 51701 INSERT BLADDER CATHETER: CPT

## 2024-07-25 PROCEDURE — 2500000005 HC RX 250 GENERAL PHARMACY W/O HCPCS

## 2024-07-25 PROCEDURE — 7210000002 HC LABOR PER HOUR

## 2024-07-25 PROCEDURE — 86780 TREPONEMA PALLIDUM: CPT | Performed by: STUDENT IN AN ORGANIZED HEALTH CARE EDUCATION/TRAINING PROGRAM

## 2024-07-25 PROCEDURE — 59409 OBSTETRICAL CARE: CPT | Performed by: OBSTETRICS & GYNECOLOGY

## 2024-07-25 PROCEDURE — 3700000014 EPIDURAL BLOCK: Mod: GC

## 2024-07-25 PROCEDURE — 2500000004 HC RX 250 GENERAL PHARMACY W/ HCPCS (ALT 636 FOR OP/ED): Performed by: STUDENT IN AN ORGANIZED HEALTH CARE EDUCATION/TRAINING PROGRAM

## 2024-07-25 PROCEDURE — 86901 BLOOD TYPING SEROLOGIC RH(D): CPT | Performed by: STUDENT IN AN ORGANIZED HEALTH CARE EDUCATION/TRAINING PROGRAM

## 2024-07-25 PROCEDURE — 36415 COLL VENOUS BLD VENIPUNCTURE: CPT | Performed by: STUDENT IN AN ORGANIZED HEALTH CARE EDUCATION/TRAINING PROGRAM

## 2024-07-25 PROCEDURE — 1100000001 HC PRIVATE ROOM DAILY

## 2024-07-25 RX ORDER — OXYTOCIN/0.9 % SODIUM CHLORIDE 30/500 ML
60 PLASTIC BAG, INJECTION (ML) INTRAVENOUS ONCE AS NEEDED
Status: COMPLETED | OUTPATIENT
Start: 2024-07-25 | End: 2024-07-25

## 2024-07-25 RX ORDER — METHYLERGONOVINE MALEATE 0.2 MG/ML
0.2 INJECTION INTRAVENOUS ONCE AS NEEDED
Status: DISCONTINUED | OUTPATIENT
Start: 2024-07-25 | End: 2024-07-26

## 2024-07-25 RX ORDER — OXYTOCIN/0.9 % SODIUM CHLORIDE 30/500 ML
2-30 PLASTIC BAG, INJECTION (ML) INTRAVENOUS CONTINUOUS
Status: DISCONTINUED | OUTPATIENT
Start: 2024-07-25 | End: 2024-07-26

## 2024-07-25 RX ORDER — PENICILLIN G 3000000 [IU]/50ML
3 INJECTION, SOLUTION INTRAVENOUS EVERY 4 HOURS
Status: DISCONTINUED | OUTPATIENT
Start: 2024-07-25 | End: 2024-07-26

## 2024-07-25 RX ORDER — MISOPROSTOL 200 UG/1
800 TABLET ORAL ONCE AS NEEDED
Status: DISCONTINUED | OUTPATIENT
Start: 2024-07-25 | End: 2024-07-26

## 2024-07-25 RX ORDER — OXYTOCIN 10 [USP'U]/ML
10 INJECTION, SOLUTION INTRAMUSCULAR; INTRAVENOUS ONCE AS NEEDED
Status: DISCONTINUED | OUTPATIENT
Start: 2024-07-25 | End: 2024-07-26

## 2024-07-25 RX ORDER — ONDANSETRON HYDROCHLORIDE 2 MG/ML
4 INJECTION, SOLUTION INTRAVENOUS EVERY 6 HOURS PRN
Status: DISCONTINUED | OUTPATIENT
Start: 2024-07-25 | End: 2024-07-26

## 2024-07-25 RX ORDER — LOPERAMIDE HYDROCHLORIDE 2 MG/1
4 CAPSULE ORAL EVERY 2 HOUR PRN
Status: DISCONTINUED | OUTPATIENT
Start: 2024-07-25 | End: 2024-07-26

## 2024-07-25 RX ORDER — OXYTOCIN/0.9 % SODIUM CHLORIDE 30/500 ML
2-30 PLASTIC BAG, INJECTION (ML) INTRAVENOUS CONTINUOUS
Status: DISCONTINUED | OUTPATIENT
Start: 2024-07-25 | End: 2024-07-25

## 2024-07-25 RX ORDER — NIFEDIPINE 10 MG/1
10 CAPSULE ORAL ONCE AS NEEDED
Status: DISCONTINUED | OUTPATIENT
Start: 2024-07-25 | End: 2024-07-26

## 2024-07-25 RX ORDER — METOCLOPRAMIDE HYDROCHLORIDE 5 MG/ML
10 INJECTION INTRAMUSCULAR; INTRAVENOUS EVERY 6 HOURS PRN
Status: DISCONTINUED | OUTPATIENT
Start: 2024-07-25 | End: 2024-07-26

## 2024-07-25 RX ORDER — LIDOCAINE HYDROCHLORIDE AND EPINEPHRINE 15; 5 MG/ML; UG/ML
INJECTION, SOLUTION EPIDURAL AS NEEDED
Status: DISCONTINUED | OUTPATIENT
Start: 2024-07-25 | End: 2024-07-25

## 2024-07-25 RX ORDER — ONDANSETRON 4 MG/1
4 TABLET, FILM COATED ORAL EVERY 6 HOURS PRN
Status: DISCONTINUED | OUTPATIENT
Start: 2024-07-25 | End: 2024-07-26

## 2024-07-25 RX ORDER — LIDOCAINE HYDROCHLORIDE 10 MG/ML
30 INJECTION INFILTRATION; PERINEURAL ONCE AS NEEDED
Status: DISCONTINUED | OUTPATIENT
Start: 2024-07-25 | End: 2024-07-26

## 2024-07-25 RX ORDER — BUPRENORPHINE HYDROCHLORIDE AND NALOXONE HYDROCHLORIDE DIHYDRATE 8; 2 MG/1; MG/1
2 TABLET SUBLINGUAL DAILY
Status: DISCONTINUED | OUTPATIENT
Start: 2024-07-26 | End: 2024-07-27 | Stop reason: HOSPADM

## 2024-07-25 RX ORDER — FENTANYL/BUPIVACAINE/NS/PF 2MCG/ML-.1
PLASTIC BAG, INJECTION (ML) INJECTION AS NEEDED
Status: DISCONTINUED | OUTPATIENT
Start: 2024-07-25 | End: 2024-07-25

## 2024-07-25 RX ORDER — CARBOPROST TROMETHAMINE 250 UG/ML
250 INJECTION, SOLUTION INTRAMUSCULAR ONCE AS NEEDED
Status: DISCONTINUED | OUTPATIENT
Start: 2024-07-25 | End: 2024-07-26

## 2024-07-25 RX ORDER — FENTANYL/BUPIVACAINE/NS/PF 2MCG/ML-.1
PLASTIC BAG, INJECTION (ML) INJECTION CONTINUOUS PRN
Status: DISCONTINUED | OUTPATIENT
Start: 2024-07-25 | End: 2024-07-25

## 2024-07-25 RX ORDER — TRANEXAMIC ACID 100 MG/ML
1000 INJECTION, SOLUTION INTRAVENOUS ONCE AS NEEDED
Status: DISCONTINUED | OUTPATIENT
Start: 2024-07-25 | End: 2024-07-26

## 2024-07-25 RX ORDER — HYDRALAZINE HYDROCHLORIDE 20 MG/ML
5 INJECTION INTRAMUSCULAR; INTRAVENOUS ONCE AS NEEDED
Status: DISCONTINUED | OUTPATIENT
Start: 2024-07-25 | End: 2024-07-26

## 2024-07-25 RX ORDER — SODIUM CHLORIDE, SODIUM LACTATE, POTASSIUM CHLORIDE, CALCIUM CHLORIDE 600; 310; 30; 20 MG/100ML; MG/100ML; MG/100ML; MG/100ML
125 INJECTION, SOLUTION INTRAVENOUS CONTINUOUS
Status: DISCONTINUED | OUTPATIENT
Start: 2024-07-25 | End: 2024-07-26

## 2024-07-25 RX ORDER — TERBUTALINE SULFATE 1 MG/ML
0.25 INJECTION SUBCUTANEOUS ONCE AS NEEDED
Status: DISCONTINUED | OUTPATIENT
Start: 2024-07-25 | End: 2024-07-26

## 2024-07-25 RX ORDER — LABETALOL HYDROCHLORIDE 5 MG/ML
20 INJECTION, SOLUTION INTRAVENOUS ONCE AS NEEDED
Status: DISCONTINUED | OUTPATIENT
Start: 2024-07-25 | End: 2024-07-26

## 2024-07-25 RX ORDER — VALACYCLOVIR HYDROCHLORIDE 500 MG/1
500 TABLET, FILM COATED ORAL EVERY 12 HOURS SCHEDULED
Status: DISCONTINUED | OUTPATIENT
Start: 2024-07-25 | End: 2024-07-27 | Stop reason: HOSPADM

## 2024-07-25 RX ORDER — METOCLOPRAMIDE 10 MG/1
10 TABLET ORAL EVERY 6 HOURS PRN
Status: DISCONTINUED | OUTPATIENT
Start: 2024-07-25 | End: 2024-07-26

## 2024-07-25 SDOH — SOCIAL STABILITY: SOCIAL INSECURITY: HAVE YOU HAD ANY THOUGHTS OF HARMING ANYONE ELSE?: NO

## 2024-07-25 SDOH — SOCIAL STABILITY: SOCIAL INSECURITY: ARE THERE ANY APPARENT SIGNS OF INJURIES/BEHAVIORS THAT COULD BE RELATED TO ABUSE/NEGLECT?: NO

## 2024-07-25 SDOH — ECONOMIC STABILITY: HOUSING INSECURITY: DO YOU FEEL UNSAFE GOING BACK TO THE PLACE WHERE YOU ARE LIVING?: NO

## 2024-07-25 SDOH — SOCIAL STABILITY: SOCIAL INSECURITY: VERBAL ABUSE: DENIES

## 2024-07-25 SDOH — HEALTH STABILITY: MENTAL HEALTH: WERE YOU ABLE TO COMPLETE ALL THE BEHAVIORAL HEALTH SCREENINGS?: YES

## 2024-07-25 SDOH — HEALTH STABILITY: MENTAL HEALTH: CURRENT SMOKER: 1

## 2024-07-25 SDOH — SOCIAL STABILITY: SOCIAL INSECURITY: ABUSE SCREEN: ADULT

## 2024-07-25 SDOH — HEALTH STABILITY: MENTAL HEALTH: WISH TO BE DEAD (PAST 1 MONTH): NO

## 2024-07-25 SDOH — SOCIAL STABILITY: SOCIAL INSECURITY: HAS ANYONE EVER THREATENED TO HURT YOUR FAMILY OR YOUR PETS?: NO

## 2024-07-25 SDOH — SOCIAL STABILITY: SOCIAL INSECURITY: HAVE YOU HAD THOUGHTS OF HARMING ANYONE ELSE?: NO

## 2024-07-25 SDOH — SOCIAL STABILITY: SOCIAL INSECURITY: DO YOU FEEL ANYONE HAS EXPLOITED OR TAKEN ADVANTAGE OF YOU FINANCIALLY OR OF YOUR PERSONAL PROPERTY?: NO

## 2024-07-25 SDOH — SOCIAL STABILITY: SOCIAL INSECURITY: DOES ANYONE TRY TO KEEP YOU FROM HAVING/CONTACTING OTHER FRIENDS OR DOING THINGS OUTSIDE YOUR HOME?: NO

## 2024-07-25 SDOH — SOCIAL STABILITY: SOCIAL INSECURITY: PHYSICAL ABUSE: DENIES

## 2024-07-25 SDOH — HEALTH STABILITY: MENTAL HEALTH: SUICIDAL BEHAVIOR (LIFETIME): NO

## 2024-07-25 SDOH — SOCIAL STABILITY: SOCIAL INSECURITY: ARE YOU OR HAVE YOU BEEN THREATENED OR ABUSED PHYSICALLY, EMOTIONALLY, OR SEXUALLY BY ANYONE?: NO

## 2024-07-25 SDOH — HEALTH STABILITY: MENTAL HEALTH: NON-SPECIFIC ACTIVE SUICIDAL THOUGHTS (PAST 1 MONTH): NO

## 2024-07-25 ASSESSMENT — PATIENT HEALTH QUESTIONNAIRE - PHQ9
2. FEELING DOWN, DEPRESSED OR HOPELESS: NOT AT ALL
1. LITTLE INTEREST OR PLEASURE IN DOING THINGS: NOT AT ALL
SUM OF ALL RESPONSES TO PHQ9 QUESTIONS 1 & 2: 0

## 2024-07-25 ASSESSMENT — PAIN SCALES - GENERAL
PAINLEVEL_OUTOF10: 0 - NO PAIN

## 2024-07-25 ASSESSMENT — LIFESTYLE VARIABLES
SKIP TO QUESTIONS 9-10: 1
HOW OFTEN DO YOU HAVE 6 OR MORE DRINKS ON ONE OCCASION: NEVER
HOW OFTEN DO YOU HAVE A DRINK CONTAINING ALCOHOL: NEVER
HOW MANY STANDARD DRINKS CONTAINING ALCOHOL DO YOU HAVE ON A TYPICAL DAY: PATIENT DOES NOT DRINK
AUDIT-C TOTAL SCORE: 0
AUDIT-C TOTAL SCORE: 0

## 2024-07-25 ASSESSMENT — ACTIVITIES OF DAILY LIVING (ADL): LACK_OF_TRANSPORTATION: NO

## 2024-07-25 NOTE — ANESTHESIA PREPROCEDURE EVALUATION
Patient: Matthias Tafoya    Evaluation Method: In-person visit    Procedure Information    Date: 07/25/24  Procedure: Labor Consult         Relevant Problems   Neuro   (+) Opioid use disorder, severe, on maintenance therapy (Multi)      Hematology   (+) Anemia affecting pregnancy in second trimester ([facility])      GYN   (+) 36 weeks gestation of pregnancy ([facility])   (+) Multigravida in third trimester ([facility])   (+) Supervision of high risk pregnancy in third trimester ([facility])       Clinical information reviewed:   Tobacco  Allergies  Meds   Med Hx  Surg Hx   Fam Hx  Soc Hx        NPO Detail:  No data recorded     OB/GYN     Physical Exam    Airway  Mallampati: I  TM distance: >3 FB  Neck ROM: full     Cardiovascular   Rate: normal     Dental    Pulmonary    Abdominal            Anesthesia Plan    History of general anesthesia?: yes  History of complications of general anesthesia?: no    ASA 2     regional     The patient is a current smoker. (VAPES NICOTINE)    Anesthetic plan and risks discussed with patient.  Use of blood products discussed with patient who consented to blood products.    Plan discussed with attending.

## 2024-07-25 NOTE — ANESTHESIA PREPROCEDURE EVALUATION
Patient: Matthias Tafoya    Evaluation Method: In-person visit    Procedure Information    Date: 07/25/24  Procedure: Labor Analgesia         Relevant Problems   Cardiac (within normal limits)      Pulmonary (within normal limits)      Neuro   (+) Opioid use disorder, severe, on maintenance therapy (Multi)      GI (within normal limits)      /Renal (within normal limits)      Liver (within normal limits)      Hematology   (+) Anemia affecting pregnancy in second trimester ([facility])      Musculoskeletal (within normal limits)      GYN   (+) 36 weeks gestation of pregnancy ([facility])   (+) Multigravida in third trimester ([facility])   (+) Supervision of high risk pregnancy in third trimester ([facility])      Digestive   (+) History of hepatitis C      Tobacco   (+) Nicotine use (vaping)      Gastrointestinal and Abdominal   (+) History of cholestasis during pregnancy      Mental Health   (+) Anxiety during pregnancy ([facility])       Clinical information reviewed:   Tobacco  Allergies  Meds  Problems  Med Hx  Surg Hx   Fam Hx  Soc   Hx        NPO Detail:  NPO/Void Status  Date of Last Liquid: 07/25/24  Time of Last Liquid: 0900         OB/Gyn Evaluation    Present Pregnancy    Patient is pregnant now.   Obstetric History                Physical Exam    Airway  Mallampati: II     Cardiovascular   Rhythm: regular  Rate: normal     Dental - normal exam     Pulmonary   Breath sounds clear to auscultation     Abdominal            Anesthesia Plan    History of general anesthesia?: unknown/emergency  History of complications of general anesthesia?: unknown/emergency    ASA 2     The patient is a current smoker. (vape)    Anesthetic plan and risks discussed with patient.  Use of blood products discussed with patient who consented to blood products.    Plan discussed with attending and resident.

## 2024-07-25 NOTE — ANESTHESIA PROCEDURE NOTES
Epidural Block    Patient location during procedure: floor  Reason for block: labor analgesia  Staffing  Performed: resident   Authorized by: Jessy Huitron MD    Performed by: Danielle Nassar DO    Preanesthetic Checklist  Completed: patient identified, IV checked, risks and benefits discussed, surgical consent, monitors and equipment checked, pre-op evaluation, timeout performed and sterile techniques followed  Block Timeout  RN/Licensed healthcare professional reads aloud to the Anesthesia provider and entire team: Patient identity, procedure with side and site, patient position, and as applicable the availability of implants/special equipment/special requirements.  Patient on coagulant treatment: no  Timeout performed at: 7/25/2024 1:48 PM  Block Placement  Patient position: sitting  Prep: ChloraPrep  Sterility prep: cap, drape, gloves and mask  Sedation level: no sedation  Patient monitoring: blood pressure, continuous pulse oximetry and heart rate  Approach: midline  Local numbing: lidocaine 1% to skin and subcutaneous tissues  Vertebral space: lumbar  Lumbar location: L4-L5  Epidural  Loss of resistance technique: saline  Guidance: landmark technique        Needle  Needle type: Tuohy   Needle gauge: 17  Needle length: 8.9cm  Catheter size: 19 G  Catheter securement method: clear occlusive dressing and liquid medical adhesive    Test dose: lidocaine 1.5% with epinephrine 1-to-200,000  Test dose: lidocaine 1.5% with epinephrine 1-to-200,000  Test dose result: no positive test dose    PCEA  Medication concentration used: 0.044% Bupivacaine with 1.25 mcg/mL Fentanyl and 1:[id number] Epinephrine  Dose (mL): 10  Lockout (minutes): 15  1-Hour Limit (boluses/hr): 4  Basal Rate: 14        Assessment  Sensory level: T10 bilateral  Block outcome: pain improved  Events: no positive test dose  Procedure assessment: patient tolerated procedure well with no immediate complications

## 2024-07-25 NOTE — SIGNIFICANT EVENT
"Labor Progress Note    Subjective: In room to offer AROM, patient amenable. Resting in bed with epidural infusing.    Objective:  Vitals: /56   Pulse 82   Temp 36.5 °C (97.7 °F) (Temporal)   Resp 18   Ht 1.549 m (5' 1\")   Wt 82.3 kg (181 lb 7 oz)   LMP  (LMP Unknown)   SpO2 100%   BMI 34.28 kg/m²   SVE: 3/70/-3  FHT: 130/mod/+accels/-decels  [place]: q2min    A/P:  AROM for clear @ 1430  Continue pitocin per protocol, currently at 6 mL/hr  CEFM, currently Category I  Epidural infusing    Zulema Finnegan, MS4  OB/GYN    "
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
## 2024-07-25 NOTE — ANESTHESIA PROCEDURE NOTES
Epidural Block    Patient location during procedure: OB  Start time: 7/25/2024 1:50 PM  Reason for block: labor analgesia  Staffing  Performed: resident   Authorized by: Jessy Huitron MD    Performed by: Danielle Nassar DO    Preanesthetic Checklist  Completed: patient identified, IV checked, risks and benefits discussed, surgical consent, monitors and equipment checked, pre-op evaluation, timeout performed and sterile techniques followed  Block Timeout  RN/Licensed healthcare professional reads aloud to the Anesthesia provider and entire team: Patient identity, procedure with side and site, patient position, and as applicable the availability of implants/special equipment/special requirements.  Patient on coagulant treatment: no  Timeout performed at: 7/25/2024 1:50 PM  Block Placement  Patient position: sitting  Prep: ChloraPrep  Sterility prep: cap, drape, gloves and mask  Sedation level: no sedation  Patient monitoring: blood pressure, continuous pulse oximetry and heart rate  Approach: midline  Local numbing: lidocaine 1% to skin and subcutaneous tissues  Vertebral space: lumbar  Lumbar location: L4-L5  Epidural  Loss of resistance technique: saline  Guidance: landmark technique        Needle  Needle type: Tuohy   Needle gauge: 17  Needle length: 8.9cm  Needle insertion depth: 6.5 cm  Catheter type: multi-orifice  Catheter size: 19 G  Catheter at skin depth: 11.5 cm  Catheter securement method: clear occlusive dressing and liquid medical adhesive    Test dose: lidocaine 1.5% with epinephrine 1-to-200,000  Test dose: lidocaine 1.5% with epinephrine 1-to-200,000  Test dose result: no positive test dose    PCEA  Medication concentration used: 0.044% Bupivacaine with 1.25 mcg/mL Fentanyl and 1:470565 Epinephrine  Dose (mL): 10  Lockout (minutes): 15  1-Hour Limit (boluses/hr): 4  Basal Rate: 14        Assessment  Sensory level: T10 bilateral  Block outcome: pain improved  Number of attempts: 1  Events: no  positive test dose  Procedure assessment: patient tolerated procedure well with no immediate complications

## 2024-07-25 NOTE — PROGRESS NOTES
Intrapartum Progress Note    Assessment/Plan   Matthias Tafoya is a 28 y.o.  at 39w0d, SAIGE: 2024, by Ultrasound admitted for scheduled IOL.     IOL  Method: pit, currently running at 12, will continue to increase per protocol  Pain management: epidural running  CEFM, currently Category I  GBS: pos, PCN,   Next exam: as clinically indicated   Labor course   1111 350/-3  1200 pit start  1430 3/70/3, AROM clear    Maternal conditions  H/o ROMELIA (meth & heroin) on Suboxone 16 mg, follows at [facility], UDS on admission neg  HSV on Valtrex 1g, neg SSE on admission  h/o HCV s/p treatment, viral load undetectable as of 2024  h/o  x3, cholestasis with last pregnancy requiring delivery at 35wks    Postpartum planning:  Contraception: POPs  Feeding: BF, supplement with formula    Seen and discussed with Dr. aGto Bush MD PGY-1   OBGYN    Subjective   Pt is lying comfortably in bed. Denies HA, CP, SOB, RUQ pain and vision changes. She has no complaints or questions at this time.     Objective   Last Vitals:  Temp Pulse Resp BP MAP Pulse Ox   36.7 °C (98.1 °F) 77 18 107/60   100 %     Vitals Min/Max Last 24 Hours:  Temp  Min: 36.2 °C (97.2 °F)  Max: 36.7 °C (98.1 °F)  Pulse  Min: 68  Max: 87  Resp  Min: 16  Max: 18  BP  Min: 94/55  Max: 122/71    Physical Examination:  General: no acute distress  HEENT: normocephalic, atraumatic  Heart: warm and well perfused  Lungs: breathing comfortably on room air  Abdomen: gravid  Extremities: moving all extremities  Neuro: awake and conversant  Psych: appropriate mood and affect    SVE: 3/70/-3 per Dr Hoskins at 1430  FHT: 125/mod/+accel/-decel  [place]: q2-3min    Lab Review:  Labs in chart have been reviewed  Hb 11.1, Rh pos  Tox screen and syphilis neg

## 2024-07-26 PROCEDURE — 2500000002 HC RX 250 W HCPCS SELF ADMINISTERED DRUGS (ALT 637 FOR MEDICARE OP, ALT 636 FOR OP/ED)

## 2024-07-26 PROCEDURE — 0UC97ZZ EXTIRPATION OF MATTER FROM UTERUS, VIA NATURAL OR ARTIFICIAL OPENING: ICD-10-PCS | Performed by: STUDENT IN AN ORGANIZED HEALTH CARE EDUCATION/TRAINING PROGRAM

## 2024-07-26 PROCEDURE — 2720000007 HC OR 272 NO HCPCS

## 2024-07-26 PROCEDURE — 1100000001 HC PRIVATE ROOM DAILY

## 2024-07-26 PROCEDURE — 2500000001 HC RX 250 WO HCPCS SELF ADMINISTERED DRUGS (ALT 637 FOR MEDICARE OP)

## 2024-07-26 RX ORDER — ONDANSETRON 4 MG/1
4 TABLET, FILM COATED ORAL EVERY 6 HOURS PRN
Status: DISCONTINUED | OUTPATIENT
Start: 2024-07-26 | End: 2024-07-27 | Stop reason: HOSPADM

## 2024-07-26 RX ORDER — OXYTOCIN/0.9 % SODIUM CHLORIDE 30/500 ML
60 PLASTIC BAG, INJECTION (ML) INTRAVENOUS ONCE AS NEEDED
Status: DISCONTINUED | OUTPATIENT
Start: 2024-07-26 | End: 2024-07-27 | Stop reason: HOSPADM

## 2024-07-26 RX ORDER — SIMETHICONE 80 MG
80 TABLET,CHEWABLE ORAL 4 TIMES DAILY PRN
Status: DISCONTINUED | OUTPATIENT
Start: 2024-07-26 | End: 2024-07-27 | Stop reason: HOSPADM

## 2024-07-26 RX ORDER — METHYLERGONOVINE MALEATE 0.2 MG/ML
0.2 INJECTION INTRAVENOUS ONCE AS NEEDED
Status: DISCONTINUED | OUTPATIENT
Start: 2024-07-26 | End: 2024-07-27 | Stop reason: HOSPADM

## 2024-07-26 RX ORDER — HYDRALAZINE HYDROCHLORIDE 20 MG/ML
5 INJECTION INTRAMUSCULAR; INTRAVENOUS ONCE AS NEEDED
Status: DISCONTINUED | OUTPATIENT
Start: 2024-07-26 | End: 2024-07-27 | Stop reason: HOSPADM

## 2024-07-26 RX ORDER — IBUPROFEN 600 MG/1
600 TABLET ORAL EVERY 6 HOURS
Status: DISCONTINUED | OUTPATIENT
Start: 2024-07-26 | End: 2024-07-27 | Stop reason: HOSPADM

## 2024-07-26 RX ORDER — ACETAMINOPHEN 325 MG/1
975 TABLET ORAL EVERY 6 HOURS
Qty: 360 TABLET | Refills: 0 | Status: CANCELLED | OUTPATIENT
Start: 2024-07-26 | End: 2024-08-25

## 2024-07-26 RX ORDER — LABETALOL HYDROCHLORIDE 5 MG/ML
20 INJECTION, SOLUTION INTRAVENOUS ONCE AS NEEDED
Status: DISCONTINUED | OUTPATIENT
Start: 2024-07-26 | End: 2024-07-27 | Stop reason: HOSPADM

## 2024-07-26 RX ORDER — BISACODYL 10 MG/1
10 SUPPOSITORY RECTAL DAILY PRN
Status: DISCONTINUED | OUTPATIENT
Start: 2024-07-26 | End: 2024-07-27 | Stop reason: HOSPADM

## 2024-07-26 RX ORDER — ACETAMINOPHEN 325 MG/1
975 TABLET ORAL EVERY 6 HOURS
Status: DISCONTINUED | OUTPATIENT
Start: 2024-07-26 | End: 2024-07-27 | Stop reason: HOSPADM

## 2024-07-26 RX ORDER — LIDOCAINE 560 MG/1
1 PATCH PERCUTANEOUS; TOPICAL; TRANSDERMAL
Status: DISCONTINUED | OUTPATIENT
Start: 2024-07-26 | End: 2024-07-27 | Stop reason: HOSPADM

## 2024-07-26 RX ORDER — OXYTOCIN 10 [USP'U]/ML
10 INJECTION, SOLUTION INTRAMUSCULAR; INTRAVENOUS ONCE AS NEEDED
Status: DISCONTINUED | OUTPATIENT
Start: 2024-07-26 | End: 2024-07-27 | Stop reason: HOSPADM

## 2024-07-26 RX ORDER — ADHESIVE BANDAGE
10 BANDAGE TOPICAL
Status: DISCONTINUED | OUTPATIENT
Start: 2024-07-26 | End: 2024-07-27 | Stop reason: HOSPADM

## 2024-07-26 RX ORDER — CARBOPROST TROMETHAMINE 250 UG/ML
250 INJECTION, SOLUTION INTRAMUSCULAR ONCE AS NEEDED
Status: DISCONTINUED | OUTPATIENT
Start: 2024-07-26 | End: 2024-07-27 | Stop reason: HOSPADM

## 2024-07-26 RX ORDER — DIPHENHYDRAMINE HCL 25 MG
25 CAPSULE ORAL EVERY 6 HOURS PRN
Status: DISCONTINUED | OUTPATIENT
Start: 2024-07-26 | End: 2024-07-27 | Stop reason: HOSPADM

## 2024-07-26 RX ORDER — MISOPROSTOL 200 UG/1
800 TABLET ORAL ONCE AS NEEDED
Status: DISCONTINUED | OUTPATIENT
Start: 2024-07-26 | End: 2024-07-27 | Stop reason: HOSPADM

## 2024-07-26 RX ORDER — ONDANSETRON HYDROCHLORIDE 2 MG/ML
4 INJECTION, SOLUTION INTRAVENOUS EVERY 6 HOURS PRN
Status: DISCONTINUED | OUTPATIENT
Start: 2024-07-26 | End: 2024-07-27 | Stop reason: HOSPADM

## 2024-07-26 RX ORDER — DIPHENHYDRAMINE HYDROCHLORIDE 50 MG/ML
25 INJECTION INTRAMUSCULAR; INTRAVENOUS EVERY 6 HOURS PRN
Status: DISCONTINUED | OUTPATIENT
Start: 2024-07-26 | End: 2024-07-27 | Stop reason: HOSPADM

## 2024-07-26 RX ORDER — TRANEXAMIC ACID 100 MG/ML
1000 INJECTION, SOLUTION INTRAVENOUS ONCE AS NEEDED
Status: DISCONTINUED | OUTPATIENT
Start: 2024-07-26 | End: 2024-07-27 | Stop reason: HOSPADM

## 2024-07-26 RX ORDER — LOPERAMIDE HYDROCHLORIDE 2 MG/1
4 CAPSULE ORAL EVERY 2 HOUR PRN
Status: DISCONTINUED | OUTPATIENT
Start: 2024-07-26 | End: 2024-07-27 | Stop reason: HOSPADM

## 2024-07-26 RX ORDER — NIFEDIPINE 10 MG/1
10 CAPSULE ORAL ONCE AS NEEDED
Status: DISCONTINUED | OUTPATIENT
Start: 2024-07-26 | End: 2024-07-27 | Stop reason: HOSPADM

## 2024-07-26 RX ORDER — POLYETHYLENE GLYCOL 3350 17 G/17G
17 POWDER, FOR SOLUTION ORAL 2 TIMES DAILY PRN
Status: DISCONTINUED | OUTPATIENT
Start: 2024-07-26 | End: 2024-07-27 | Stop reason: HOSPADM

## 2024-07-26 RX ORDER — POLYETHYLENE GLYCOL 3350 17 G/17G
17 POWDER, FOR SOLUTION ORAL DAILY
Qty: 30 PACKET | Refills: 0 | Status: CANCELLED | OUTPATIENT
Start: 2024-07-26 | End: 2024-08-25

## 2024-07-26 RX ORDER — IBUPROFEN 600 MG/1
600 TABLET ORAL EVERY 6 HOURS
Qty: 120 TABLET | Refills: 0 | Status: CANCELLED | OUTPATIENT
Start: 2024-07-26 | End: 2024-08-25

## 2024-07-26 ASSESSMENT — PAIN SCALES - GENERAL
PAINLEVEL_OUTOF10: 2
PAINLEVEL_OUTOF10: 0 - NO PAIN
PAINLEVEL_OUTOF10: 0 - NO PAIN
PAIN_LEVEL: 0
PAINLEVEL_OUTOF10: 6
PAINLEVEL_OUTOF10: 6
PAINLEVEL_OUTOF10: 0 - NO PAIN
PAINLEVEL_OUTOF10: 1

## 2024-07-26 ASSESSMENT — PAIN DESCRIPTION - DESCRIPTORS: DESCRIPTORS: CRAMPING

## 2024-07-26 NOTE — PROGRESS NOTES
"Social Work Assessment     Patient: Matthias Tafoya   Address: [address] [place], [place] [id number]  Phone: [phone]    Referral Reason: History of ROMELIA, No custody of other children     Prenatal Care: Yes     [place] Name: \"Ellyn\"  [place] : 2024    Other Children: Yes, 2013 (Yolanda),2010 (Barbie), 2020 (Dheeraj)     Household Composition: Lives in stable housing with spouse     IPV/DV or Safety Concerns: Denies     Car-Seat: Yes   Safe Sleep Space: Yes   Safe Sleep Education: Yes    Transportation Concerns: Denies     School/Work/Income: Service Industry- manager at [employer]     Insurance: [employer]     Mental Health Diagnoses: Anxiety, PTSD, Depression,   Medication(s): Past   Counseling: Current- [facility]     Supports: Strong support system- Spouse, and MIL     Substance Use History: Meth + Heroin- reporting no drug use since 2023.  She's currently on Suboxone- [facility]    Toxicology Screens: 2024-Negative, 24- Negative     Department of Children and Family Services (DCFS): Past      Assessment: SW met with patient at bedside to introduce self, and SW role.  Ms. Tafoya was friendly, receptive, and easy to engage in conversation.  Patient's spouse, Gallito present this visit as well.  Parents appear to be bonding well with .  Ms. Tafoya reports having all supplies needed to care for  including car seat, and safe sleep location.  Parents verbalized understanding of the ABC's of safe sleep.   follow-up at [facility].  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  Household receives SNAP benefits.  [facility] appointment scheduled on 24.    Mother of baby endorses history of Depression, PTSD, Anxiety.  She's linked to counseling services at [facility].  Stable mood reported.  Ms. Tafoya stated she's aware of the signs/symptoms of PPD.  No additional resources needed.  Ms. Tafoya reported her mother has custody of her oldest two " children (DCFS involvement).  She stated her youngest child was adopted.  According to her, [facility] was not involved in the adoption of her youngest child.  Parents were advised of this 's plan to report 's birth to [facility] due to past history    Plan: Ms. Tafoya MRN: [id number] is appropriate for discharge from  perspective.  Please do not discharge  girlEllyn MRN: [id number], until safe discharge plan has been established     [facility] [phone]  Intake Staff- Alexandrea     Addendum: 24  Follow-up TC placed to [facility] at [phone], to inquire about referral status.  According to Intake staff, Patience [facility] referral has been screened out.  Mother of baby, is currently sober, in treatment, and on Suboxone.   girl, Ellyn is clear for discharge home with mom     Mary Lou RICO       Signature: Mary Lou RICO

## 2024-07-26 NOTE — PROGRESS NOTES
Reassessed patients bleeding at this time due to small trickle on admission. Patient got up to urinate before fundal check and no trickle was noted at this time. Patient was educated on normal and abnormal bleeding after delivery.

## 2024-07-26 NOTE — PROGRESS NOTES
Postpartum Progress Note      Assessment/Plan       Matthias Tafoya is a 28 y.o., , who initially presented for rrIOL. She had a Vaginal, Spontaneous delivery on 2024 at 39w0d and is now PPD1.      ROMELIA  - Meth, heroin use; reports last used   - on Suboxone 16, follows at [facility] and with [facility]  - negative UDS on admission    HSV  - Valtrex ppx during pregnancy  - neg SSE on admission    H/o HCV  - s/p treatment   - viral load undetectable       - continue routine care  - pain well controlled on ERAS protocol  - DVT Score: 3 SCDs  - Pt's blood type is O POS. The baby's blood type is O POS. Rhogam is not indicated.    Contraception  - POPs    Maternal Well-Being  - emotional support provided  - bonding with infant  - [place] feeding: breastfeeding/pumping encouraged; lactation consult prn     Dispo  - anticipate d/c on PPD#2 if meeting all post-op and postpartum milestones    - On discharge, follow up with primary OB in:       - 4-6 weeks for post-partum visit       Principal Problem:    Encounter for induction of labor ([facility])        Subjective   Her pain is well controlled with current medications  She is passing flatus  She is ambulating independently  She is tolerating a Adult diet Regular  She is voiding spontaneously  She reports no breast or nursing problems  She denies emotional concerns today     Seen at bedside with FOB present. Reports feeling well, tolerating PO without N/V, eliminating without difficulty and pain is well controlled.      Objective   Last Vitals:  Temp Pulse Resp BP MAP Pulse Ox   36.9 °C (98.4 °F) 63 18 91/58   96 %       Physical Exam:  General: well appearing, well nourished, postpartum  Obstetric: fundus firm below umbilicus, lochia light  Skin: Warm, dry; no rashes/lesions/erythema  Neuro: A/Ox3, no gross motor deficit   GI: no distension, appropriately tender, soft  Respiratory: Even and unlabored on RA  Cardiovascular: Trace BLE edema; No  erythema, warmth  Psych: appropriate mood and affect      Lab Data:  Lab Results   Component Value Date    WBC 13.1 (H) 07/25/2024    HGB 11.1 (L) 07/25/2024    HCT 32.7 (L) 07/25/2024     07/25/2024

## 2024-07-26 NOTE — SIGNIFICANT EVENT
"Labor Progress Note    Pt resting comfortably in bed, reported pressure to RN.    Vitals: /59   Pulse 86   Temp 36.5 °C (97.7 °F) (Temporal)   Resp 18   Ht 1.549 m (5' 1\")   Wt 82.3 kg (181 lb 7 oz)   LMP  (LMP Unknown)   SpO2 100%   BMI 34.28 kg/m²     SVE: 7/80/-1  FHT: 130/mod/+accel/+ rare variable decel  [place]: CTX q2-3 min    A/P:  Continue to monitor for cervical change  Continue pitocin per protocol, currently at 12  CEFM, currently Category I  Epidural infusing    Nathalia Bush MD PGY-1   OB/GYN    "

## 2024-07-26 NOTE — LACTATION NOTE
Lactation Consultant Note    Recommendations/Summary  Spoke with this mom who is pumping, feeding baby pumped milk via bottle, and supplementing with formula. Mom is not interested in latching baby to the breast. Her feeding plan for at home is to feed both pumped breast milk and formula. Mom is on suboxone currently and is interested in feeding baby her pumped milk to help decrease potential withdrawal symptoms in her , as well as to provide her with the other benefits of breast milk. Encouraged mom to continue to pump both breasts every 2-3 hours for 15-20 mins at a time. Discussed benefits of skin to skin contact for mom and baby and for mom's milk production and supply. Mom needs a pump for at home--I ordered her a Spectra via [employer]. Reviewed the outpatient lactation information.

## 2024-07-26 NOTE — SIGNIFICANT EVENT
MD notified by RN of 240cc blood on pad since delivery at 2203.    Pt is lying comfortably in bed. She is not feeling any symptoms at this time and has no complaints. No lightheadedness when she sits up.     US: Clot noted at lower uterine segment, thin endometrial stripe otherwise  Bimanual exam: 70cc clot removed from lower uterine segment, firm fundal tone, vagina hemostatic    Pt was having bleeding after delivery with clots being passed. On presentation to pt room, tone was firm on fundal exam with some clots noted in the vagina. After seeing clots on US, bimanual exam was performed by Dr Resendiz with 70cc clot removed from lower uterine segment. Tone was good with no other membranes or clots noted. After bimanual, vagina was rechecked to be hemostatic and thin endometrial stripe was noted on US by Dr Resendiz.    Nathalia Bush MD PGY-1

## 2024-07-26 NOTE — DISCHARGE INSTRUCTIONS

## 2024-07-26 NOTE — L&D DELIVERY NOTE
OB Delivery Note  2024  Matthias Tafoya  28 y.o.   Vaginal, Spontaneous       Gestational Age: 39w0d  /Para:   Quantitative Blood Loss: Admission to Discharge: 450 mL (2024 10:41 AM - 2024  3:51 AM)    Ambrocio Tafoya [[id number]]      Labor Events    Sac identifier: Sac 1  Rupture date/time: 2024 1438  Rupture type: Artificial  Fluid color: Clear  Fluid odor: None  Labor type: Induced Onset of Labor  Labor allowed to proceed with plans for an attempted vaginal birth?: Yes  Induction: Oxytocin, AROM  Induction date/time: 2024 1204  Induction indications: Risk Reducing       Labor Event Times    Labor onset date/time: 2024  Dilation complete date/time: 2024  Start pushing date/time: 2024       Labor Length    1st stage: 1h 44m  2nd stage: 0h 05m  3rd stage: 0h 04m       Placenta    Placenta delivery date/time: 2024  Placenta removal: Spontaneous  Placenta appearance: Intact  Placenta disposition: discarded       Cord    Vessels: 3 vessels  Complications: Nuchal  Nuchal intervention: reduced  Nuchal cord description: loose nuchal cord  Number of loops: 1  Delayed cord clamping?: Yes  Cord clamped date/time: 2024 22:05:00  Cord blood disposition: Lab  Gases sent?: No  Stem cell collection (by provider): No       Lacerations    Episiotomy: None  Other lacerations?: No       Anesthesia    Method: Epidural       Operative Delivery    Forceps attempted?: No  Vacuum extractor attempted?: No       Shoulder Dystocia    Shoulder dystocia present?: No        Delivery    Birth date/time: 2024 22:03:00  Delivery type: Vaginal, Spontaneous       Resuscitation    Method: Suctioning, Tactile stimulation       Apgars    Living status: Living  Apgar Component Scores:  1 min.:  5 min.:  10 min.:  15 min.:  20 min.:    Skin color:  0  1       Heart rate:  2  2       Reflex irritability:  1  1       Muscle tone:  2  2       Respiratory  effort:  2  2       Total:  7  8       Apgars assigned by: MISTY YOUNG RN       Delivery Providers    Delivering clinician: Gertrude Hoskins MD   Provider Role    April Sharma, RN Delivery Nurse    Matthias Young, RN Nursery Nurse    Hosea Resendiz MD Resident                 Intrauterine Device Inserted : Hosea Dozier MD

## 2024-07-26 NOTE — ANESTHESIA POSTPROCEDURE EVALUATION
Patient: Matthias Tafoya    Procedure Summary       Date: 24 Room / Location:     Anesthesia Start: 1341 Anesthesia Stop:     Procedure: Labor Analgesia Diagnosis:     Scheduled Providers:  Responsible Provider: Pop Umanzor DO    Anesthesia Type: epidural ASA Status: 2            Anesthesia Type: epidural    Anesthesia Post Evaluation    Patient location during evaluation: floor  Patient participation: complete - patient participated  Level of consciousness: awake and alert  Pain score: 0  Pain management: satisfactory to patient  Multimodal analgesia pain management approach  Airway patency: patent  Cardiovascular status: acceptable and blood pressure returned to baseline  Respiratory status: acceptable  Hydration status: acceptable  Postoperative Nausea and Vomiting: none    No notable events documented.    Matthias Tafoya is a 28 y.o., , who had a Vaginal, Spontaneous delivery on 2024 at 39w0d and is now POD1.    She had Neuraxial Anesthesia without immediate complications noted.       Pain well controlled    Vitals:    24 0940   BP: 112/72   Pulse:    Resp:    Temp:    SpO2:        Neuraxial site assessed. No visible redness or swelling or drainage. Patient able to ambulate and move all extremities without difficulty. Able to void. No complaints of nausea/vomiting. Tolerating PO intake well. No s/sx of PDPH.     Anesthesia will sign off     Danielle Nassar DO

## 2024-07-27 VITALS
DIASTOLIC BLOOD PRESSURE: 70 MMHG | HEART RATE: 73 BPM | HEIGHT: 61 IN | SYSTOLIC BLOOD PRESSURE: 104 MMHG | TEMPERATURE: 97.9 F | BODY MASS INDEX: 34.26 KG/M2 | OXYGEN SATURATION: 97 % | WEIGHT: 181.44 LBS | RESPIRATION RATE: 16 BRPM

## 2024-07-27 PROCEDURE — 2500000002 HC RX 250 W HCPCS SELF ADMINISTERED DRUGS (ALT 637 FOR MEDICARE OP, ALT 636 FOR OP/ED)

## 2024-07-27 PROCEDURE — 2500000001 HC RX 250 WO HCPCS SELF ADMINISTERED DRUGS (ALT 637 FOR MEDICARE OP)

## 2024-07-27 PROCEDURE — 99238 HOSP IP/OBS DSCHRG MGMT 30/<: CPT

## 2024-07-27 RX ORDER — NORETHINDRONE 0.35 MG/1
1 TABLET ORAL DAILY
Qty: 28 TABLET | Refills: 2 | Status: SHIPPED | OUTPATIENT
Start: 2024-07-27 | End: 2024-10-19

## 2024-07-27 ASSESSMENT — PAIN SCALES - GENERAL
PAINLEVEL_OUTOF10: 0 - NO PAIN
PAINLEVEL_OUTOF10: 1
PAINLEVEL_OUTOF10: 0 - NO PAIN
PAINLEVEL_OUTOF10: 1
PAINLEVEL_OUTOF10: 0 - NO PAIN
PAINLEVEL_OUTOF10: 0 - NO PAIN

## 2024-07-27 ASSESSMENT — PAIN DESCRIPTION - DESCRIPTORS
DESCRIPTORS: CRAMPING
DESCRIPTORS: CRAMPING

## 2024-07-27 ASSESSMENT — PAIN SCALES - PAIN ASSESSMENT IN ADVANCED DEMENTIA (PAINAD): TOTALSCORE: MEDICATION (SEE MAR);OTHER (COMMENT)

## 2024-07-27 ASSESSMENT — PAIN DESCRIPTION - LOCATION: LOCATION: PERINEUM

## 2024-07-27 ASSESSMENT — PAIN SCALES - WONG BAKER: WONGBAKER_NUMERICALRESPONSE: NO HURT

## 2024-07-27 NOTE — DISCHARGE SUMMARY
Discharge Summary    Admission Date: 2024  Discharge Date: 2024     Discharge Diagnosis  Encounter for induction of labor ([facility])    Hospital Course  Delivery Date: 2024 10:03 PM  Delivery type: Vaginal, Spontaneous   GA at delivery: 39w0d  Outcome: Living  Anesthesia during delivery: Epidural  Intrapartum complications:    Feeding method: Breastfeeding Status: Yes     Procedures:    Contraception at discharge: oral contraceptives    Postpartum Course:  - Pain controlled on PO meds  - Hgb: 11.1  - ROMELIA: hx methamphetamine & heroin, last use 2023, on subuxone 16mg every day, UDS neg on admission, followed by [facility] clinic and [facility] HCV: s/p treatment, viral load undetectable 2024     Patient seen on day of DC. Continuing to meet all PP milestones. Mom stable for discharge, infant staying as patient for continued monitoring. Rx for POPs sent to home pharmacy, FOB to , pt declined other rx. All questions/concerns addressed. She is medically stable and feels comfortable going home today w/ follow up as below.    PRUDENCIO Au/Rad    Pertinent Physical Exam At Time of Discharge  General: Examination reveals a well developed, well nourished, female, in no acute distress. She is alert and cooperative.  Abdomen: soft, non-distended, non-tender to palpation.  Fundus: firm, below umbilicus, and nontender.  Psychological: awake and alert; oriented to person, place, and time.  Skin: no rashes or lesions     Discharge Meds     Your medication list        START taking these medications        Instructions Last Dose Given Next Dose Due   norethindrone 0.35 mg tablet  Commonly known as: Micronor      Take 1 tablet (0.35 mg) over 28 days by mouth once daily.              CONTINUE taking these medications        Instructions Last Dose Given Next Dose Due   buprenorphine-naloxone 8-2 mg SL tablet  Commonly known as: Suboxone           docusate sodium 100 mg  capsule  Commonly known as: Colace      Take 1 capsule (100 mg) by mouth 2 times a day as needed for constipation.       WesTab Plus 27 mg iron- 1 mg tablet  Generic drug: prenatal vitamin calcium-iron-folic                  STOP taking these medications      valACYclovir 1 gram tablet  Commonly known as: Valtrex                  Where to Get Your Medications        These medications were sent to [id number] - [facility] [address]  [address] [facility] [address]      Phone: [phone]   norethindrone 0.35 mg tablet          Complications Requiring Follow-Up  Follow up in 4-6 wk for postpartum visit with your OB provider.     Test Results Pending At Discharge  Pending Labs       No current pending labs.            Outpatient Follow-Up  Future Appointments   Date Time Provider Department Center   8/13/2024  2:00 PM Gertrude Hoskins MD [id number]       I spent 20 minutes in the professional and overall care of this patient.      Lilia Chavira PA-C

## 2024-07-27 NOTE — CARE PLAN
The clinical goals for the shift include stable VS, Fundus firm, Bleeding light    Problem: Postpartum  Goal: Experiences normal postpartum course  Outcome: Progressing  Goal: Appropriate maternal -  bonding  Outcome: Progressing  Goal: Establish and maintain infant feeding pattern for adequate nutrition  Outcome: Progressing  Goal: No s/sx infection  Outcome: Progressing  Goal: No s/sx of hemorrhage  Outcome: Progressing  Goal: Minimal s/sx of HDP and BP<160/110  Outcome: Progressing     Problem: Pain - Adult  Goal: Verbalizes/displays adequate comfort level or baseline comfort level  Outcome: Progressing     Problem: Safety - Adult  Goal: Free from fall injury  Outcome: Progressing     Problem: Discharge Planning  Goal: Discharge to home or other facility with appropriate resources  Outcome: Progressing

## 2024-07-28 NOTE — LACTATION NOTE
"Lactation Consultant Note  Lactation Consultation  Reason for Consult: Follow-up assessment  Consultant Name: China Oliveira RN, IBCLC    Maternal Information       Maternal Assessment  Breast Assessment:  (deferred at this time)    Infant Assessment  Infant Behavior: Sleepy, Content after feeding  Infant Assessment:  (deferred at this time)    Feeding Assessment  Nutrition Source: Breastmilk, Formula (per mother’s request)  Feeding Method: Feeding expressed breastmilk, Nursing at the breast, Paced bottle    LATCH TOOL       Breast Pump  Pump: Individual user electric pump  Units of Volume: Ounces per session    Other OB Lactation Tools       Patient Follow-up  Inpatient Lactation Follow-up Needed : Yes  Outpatient Lactation Follow-up: Recommended    Other OB Lactation Documentation  Maternal Risk Factors:  (Hepatitis C)    Recommendations/Summary  Mother states infant recently fed and mother recently pumped using Spectra, which she has found more comfortable, over 2 ounces of expressed breast milk. Mother has been feeding infant a mix of expressed breast milk and formula in the past 24 hours. Discussed with mother could eliminate use of formula as she continues to pump out larger volumes of milk. Discussed latching with mother, mother states is interested and in fact tried latching infant last night/this morning and infant able to latch for \"a few minutes\". Father had questions on long and how to know how much infant is getting when breast feeding. We discussed that infants can pace themselves at breast to continue feeding as long as they feel hungry and are awake, alert enough to feed and typically unlatch when they feel full. We discussed importance of noting swallowing with breast feeding as well as reviewed signs of satiety. At this time I told parents I would anticipate feed to last at least 15 minutes or more at one or both breasts per feeds and to pay attention to swallowing/signs of satiety. We " "discussed that sometimes when introduced a bottle early on, infant can have preference with bottle nipple as well as flow, but still can work on latching and it is not a \"lost cause\". Encouraged to bring infant to breast first with each feed and at least attempt to get more practice with breastfeeding then if not a successful feed or cannot get infant to latch, encouraged to feed infant expressed breastmilk and pump. Reviewed milk storage guidelines with parents. Encouraged to call me with next feed as I can assist with latching process.   "

## 2024-07-29 ENCOUNTER — APPOINTMENT (OUTPATIENT)
Dept: OBSTETRICS AND GYNECOLOGY | Facility: CLINIC | Age: 29
End: 2024-07-29
Payer: COMMERCIAL

## 2024-07-29 ENCOUNTER — LACTATION ENCOUNTER (OUTPATIENT)
Dept: NURSERY | Facility: HOSPITAL | Age: 29
End: 2024-07-29

## 2024-07-29 NOTE — PROGRESS NOTES
Matthias Tafoya has a history of ROMELIA, including meth and  heroin.  She's been sober approximately eight months.  She receives treatment services at [facility].  She is currently on Suboxone.  Ms. Tafoya reports last use 2023.  Past history of meth and heroin use.  FOB is also actively in treatment at [facility].  Mother of baby, does not have custody of her other three children.  Past history with DCFS [facility]  UDS negative at delivery.  [place] not tested.      2010 (Juarez)  2013 (Barbie)  2020 (Dheeraj)

## 2024-07-29 NOTE — LACTATION NOTE
This note was copied from a baby's chart.  Lactation Consultant Note  Lactation Consultation  Reason for Consult: Follow-up assessment  Consultant Name: China Oliveira RN, IBCLC    Maternal Information       Maternal Assessment  Breast Assessment: Medium (plugged ducts in bilateral lymph areas per mother)    Infant Assessment  Infant Behavior: Deep sleep    Feeding Assessment  Nutrition Source: Breastmilk, Formula (per mother’s request)  Feeding Method: Paced bottle, Feeding expressed breastmilk    LATCH TOOL       Breast Pump  Pump: Individual user electric pump  Frequency: 8-10 times per day  Duration:  (per mother has been pumping for 15 minutes; encouraged to pump for at least 15 minutes but up to 30 minutes until breasts feel soft/empty)    Other OB Lactation Tools       Patient Follow-up       Other OB Lactation Documentation       Recommendations/Summary  Upon entering the room, mother states pumping going well just began noticing some clogged ducts in bilateral lymph areas. Mother is pumping about every 3 hours for 15 minutes at a time and getting out about 2 oz. Per session.     Related to clogged ducts, I encouraged her to utilize breast massage/heat packs prior to and during pumping, to pump hands free to be able to massage better while pumping and to pump for at least 15 minutes but up to 30 minutes until breasts feel soft(er) and empty/emptier 8-10 times in a 24 hour period. Gave mother warm packs to help with heat application prior to and during pumping.     Offered assistance to latch today and mother states she thinks she'd prefer to try to latch when she gets home, not very comfortable with latching in hospital. Encouraged to call for assistance or questions as needed.

## 2024-07-30 NOTE — LACTATION NOTE
Lactation Consultant Note  Lactation Consultation  Reason for Consult: Follow-up assessment  Consultant Name: Shirin Nuñez RN IBCLC    Maternal Information  Has mother  before?: No  Infant to breast within first 2 hours of birth?: Yes  Exclusive Pump and Bottle Feed: Yes (mother shared with this visit that she had latched infant to the breast once overnight)    Maternal Assessment  Breast Assessment: Other (Comment) (assessment deferred mother feeding infant a bottle when I arrived to room)  Nipple Assessment: Other (Comment) (deferred as mother was feeding infant a bottle at time of my arrival to room)    Infant Assessment  Infant Behavior: Light sleep  Infant Assessment:  (suck not assessed as mother was feeding infant a bottle when I arrived to room)    Feeding Assessment  Nutrition Source: Breastmilk, Formula (per mother’s request)  Feeding Method: Nursing at the breast, Feeding expressed breastmilk, Paced bottle  Unable to assess infant feeding at this time: Other (Comment) (infant was finishing a bottle when I arrived for visit)    LATCH TOOL       Breast Pump  Pump: Double breast pumping (mother using her spectra pump)  Frequency: 8-10 times per day (instructed on regular and consisitent pumping every 2-3 hours if not latching infant directly to her breast)  Duration: 15-20 minutes per session  Units of Volume: Ounces per session    Other OB Lactation Tools       Patient Follow-up  Inpatient Lactation Follow-up Needed : Yes (instructed to call for a feeding observation at breast if she latches infant for a feeding)    Other OB Lactation Documentation  Maternal Risk Factors: Other (comment) (HX of substance abuse on suboxone- Hx hepatitis C)  Infant Risk Factors: Early term birth 37-39 weeks, High birth weight >3600 g    Recommendations/Summary  A feeding was not observed with this visit. Mother was feeding infant a bottle of her own EBM via the bottle. She is pumping out large amounts of milk and  prefers to use her Spectra breast pump. Mother shared that she had latched infant to her right breast once early this morning at 0530. She reported that the feeding went well without any discomfort and infant had audible swallows noted. Reviewed with mother signs of a proper latch and the importance of latching infant deeply as well for her comfort and effective milk transfer. Discussed with mother how latching infant directly to her breast is preferred as infant will both stimulate and drain her breast better than a pump. Instructed mother to please call if she latches infant so that a feeding can be observed. Educated mother on how to properly label, store and feed infant her own ebm. Provided mother with PI sheet # 123 (tips on pumping and storing breast milk) for her reference. Mother was also given the cdc leaflet on how to keep your breast pump clean. Reviewed with mother the availability of the outpatient lactation centers for lactation assistance upon discharge. Mother denied having any questions nor concerns at this time.

## 2024-07-31 NOTE — PROGRESS NOTES
24    SW received a TC from [facility] staff Tiffanie Villafana requesting additional contact information for Ms. Matthias Tafoya.  This LSW informed  that mother of baby, and  had already been discharged on 24.  The only contact info available was provided at the time of the [facility] referral.  SW asked why  was seeking additional information since the referral had been screened out.  According to Tiffanie, [facility] received a TC from a family member reporting they suspect the family may be homeless.  Ms. Rizzo is using a family members address, but does not live in that location.  SW suggested  track mom down at 's upcoming appointment on 24, with Dr. Chan at [address] [facility], [place] [address].  SW also suggested  could possibly locate Ms. Tafoya at her treatment facility [facility].      [facility]- Tiffanie Villafana: [phone]    [facility] LSW

## 2024-08-06 ENCOUNTER — TELEMEDICINE (OUTPATIENT)
Dept: OBSTETRICS AND GYNECOLOGY | Facility: CLINIC | Age: 29
End: 2024-08-06
Payer: COMMERCIAL

## 2024-08-06 DIAGNOSIS — F41.1 GENERALIZED ANXIETY DISORDER: ICD-10-CM

## 2024-08-06 DIAGNOSIS — F43.23 SITUATIONAL MIXED ANXIETY AND DEPRESSIVE DISORDER: Primary | ICD-10-CM

## 2024-08-06 PROCEDURE — 99214 OFFICE O/P EST MOD 30 MIN: CPT | Performed by: OBSTETRICS & GYNECOLOGY

## 2024-08-06 PROCEDURE — 1036F TOBACCO NON-USER: CPT | Performed by: OBSTETRICS & GYNECOLOGY

## 2024-08-06 PROCEDURE — 99214 OFFICE O/P EST MOD 30 MIN: CPT | Mod: TH,GT,U1,95 | Performed by: OBSTETRICS & GYNECOLOGY

## 2024-08-06 RX ORDER — HYDROXYZINE PAMOATE 50 MG/1
50 CAPSULE ORAL 3 TIMES DAILY PRN
Qty: 90 CAPSULE | Refills: 1 | Status: SHIPPED | OUTPATIENT
Start: 2024-08-06 | End: 2024-10-05
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
## 2024-08-12 ENCOUNTER — TELEPHONE (OUTPATIENT)
Dept: PEDIATRICS | Facility: CLINIC | Age: 29
End: 2024-08-12
Payer: COMMERCIAL

## 2024-08-13 ENCOUNTER — TELEMEDICINE (OUTPATIENT)
Dept: OBSTETRICS AND GYNECOLOGY | Facility: CLINIC | Age: 29
End: 2024-08-13
Payer: COMMERCIAL

## 2024-08-13 ENCOUNTER — APPOINTMENT (OUTPATIENT)
Dept: OBSTETRICS AND GYNECOLOGY | Facility: CLINIC | Age: 29
End: 2024-08-13
Payer: COMMERCIAL

## 2024-08-13 DIAGNOSIS — F43.23 SITUATIONAL MIXED ANXIETY AND DEPRESSIVE DISORDER: Primary | ICD-10-CM

## 2024-08-13 PROCEDURE — 99214 OFFICE O/P EST MOD 30 MIN: CPT | Performed by: OBSTETRICS & GYNECOLOGY

## 2024-08-13 PROCEDURE — 1036F TOBACCO NON-USER: CPT | Performed by: OBSTETRICS & GYNECOLOGY

## 2024-08-13 PROCEDURE — 99214 OFFICE O/P EST MOD 30 MIN: CPT | Mod: TH,GT,U1,95 | Performed by: OBSTETRICS & GYNECOLOGY

## 2024-08-13 NOTE — PROGRESS NOTES
"Assessment  27 YO  sp  on 24 at 39 weeks of girl with situational depression with anxiety.     - Cont hydroxyzine 50mg Q8H prn anxiety  - Pt has an upcoming apptmt with Dr. Marcus Hillman. May benefit from  IOP.    FU 1 month    Gertrude Hoskins MD      Subjective  27 YO  sp  on 24 at 39 weeks of girl Tasia here for fu of PPD.     Sx:  Feeling better.  Acute shock has passed.  Connected to psychology.  Has good support at [facility] and through her mom too.  Has sadness for her family, including her . \"I don't think he's ever getting out of snf.\"   Trying to stay strong. Her daughter is also seeking therapy.     Past Meds  - \"I tried a bunch, including Trintellix\" - didn't work well for her  - Hydroxyzine 50mg is the only med that worked well     Contraception: POPs  Feeding: breast pumping     Preg was c/b  1. ROMELIA (meth, fentanyl): In treatment at [facility]. Takes Suboxone 16mg/d. Does individual counseling weekly. Sober date 23. UDS appropriate at delivery.  2. Hx cholestasis: required IOL at 35 weeks, had Hepatitis C at that pregnancy but was subsequently treated in  postpartum. Viral load undetectable 2024.   3. Vapes nicotine, 1 cartridge lasts her 2-3 days.   4. DIALLO, MDD, PTSD: Takes hydroxyzine prn, but out. In counseling.     Objective  Virtual or Telephone Consent     An interactive audio and video telecommunication system which permits real time communications between the patient (at the originating site) and provider (at the distant site) was utilized to provide this telehealth service.   Verbal consent was requested and obtained from Matthias Tafoya on this date, 24 for a telehealth visit.   "

## 2024-08-19 ENCOUNTER — APPOINTMENT (OUTPATIENT)
Dept: BEHAVIORAL HEALTH | Facility: CLINIC | Age: 29
End: 2024-08-19
Payer: COMMERCIAL

## 2024-08-19 DIAGNOSIS — F43.0 ACUTE STRESS DISORDER: ICD-10-CM

## 2024-08-19 PROBLEM — O99.340 ANXIETY DURING PREGNANCY (HHS-HCC): Status: RESOLVED | Noted: 2024-02-06 | Resolved: 2024-08-19

## 2024-08-19 PROBLEM — F41.9 ANXIETY DURING PREGNANCY (HHS-HCC): Status: RESOLVED | Noted: 2024-02-06 | Resolved: 2024-08-19

## 2024-08-19 PROCEDURE — 90791 PSYCH DIAGNOSTIC EVALUATION: CPT | Performed by: PSYCHOLOGIST

## 2024-08-19 PROCEDURE — 1036F TOBACCO NON-USER: CPT | Performed by: PSYCHOLOGIST
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
## 2024-08-20 ENCOUNTER — TELEPHONE (OUTPATIENT)
Dept: OBSTETRICS AND GYNECOLOGY | Facility: CLINIC | Age: 29
End: 2024-08-20
Payer: COMMERCIAL

## 2024-09-16 ENCOUNTER — TELEPHONE (OUTPATIENT)
Dept: PEDIATRICS | Facility: CLINIC | Age: 29
End: 2024-09-16
Payer: COMMERCIAL

## 2025-05-13 ENCOUNTER — SOCIAL WORK (OUTPATIENT)
Dept: PEDIATRICS | Facility: CLINIC | Age: 30
End: 2025-05-13
Payer: COMMERCIAL